# Patient Record
Sex: MALE | Race: WHITE | Employment: PART TIME | URBAN - METROPOLITAN AREA
[De-identification: names, ages, dates, MRNs, and addresses within clinical notes are randomized per-mention and may not be internally consistent; named-entity substitution may affect disease eponyms.]

---

## 2019-03-25 ENCOUNTER — HOSPITAL ENCOUNTER (OUTPATIENT)
Dept: CT IMAGING | Age: 24
Discharge: HOME | End: 2019-03-25
Attending: UROLOGY
Payer: COMMERCIAL

## 2019-03-25 DIAGNOSIS — R31.0 GROSS HEMATURIA: ICD-10-CM

## 2019-03-25 PROCEDURE — 74011636320 HC RX REV CODE- 636/320: Performed by: UROLOGY

## 2019-03-25 PROCEDURE — 74178 CT ABD&PLV WO CNTR FLWD CNTR: CPT

## 2019-03-25 RX ORDER — SODIUM CHLORIDE 0.9 % (FLUSH) 0.9 %
10 SYRINGE (ML) INJECTION
Status: COMPLETED | OUTPATIENT
Start: 2019-03-25 | End: 2019-03-25

## 2019-03-25 RX ADMIN — IOPAMIDOL 100 ML: 755 INJECTION, SOLUTION INTRAVENOUS at 15:41

## 2019-03-25 RX ADMIN — Medication 10 ML: at 15:41

## 2021-06-24 ENCOUNTER — HOSPITAL ENCOUNTER (EMERGENCY)
Age: 26
Discharge: OTHER INSTITUTION | End: 2021-06-24
Attending: EMERGENCY MEDICINE

## 2021-06-24 ENCOUNTER — HOSPITAL ENCOUNTER (INPATIENT)
Age: 26
LOS: 7 days | Discharge: HOME | DRG: 885 | End: 2021-07-02
Attending: PSYCHIATRY & NEUROLOGY | Admitting: PSYCHIATRY & NEUROLOGY
Payer: MEDICARE

## 2021-06-24 VITALS
RESPIRATION RATE: 18 BRPM | BODY MASS INDEX: 23.49 KG/M2 | HEART RATE: 67 BPM | SYSTOLIC BLOOD PRESSURE: 126 MMHG | WEIGHT: 155 LBS | DIASTOLIC BLOOD PRESSURE: 65 MMHG | OXYGEN SATURATION: 97 % | HEIGHT: 68 IN | TEMPERATURE: 98 F

## 2021-06-24 DIAGNOSIS — F20.9 SCHIZOPHRENIA, UNSPECIFIED TYPE (HCC): Primary | ICD-10-CM

## 2021-06-24 LAB
ALBUMIN SERPL-MCNC: 4.3 G/DL (ref 3.5–5)
ALBUMIN/GLOB SERPL: 1.5 {RATIO} (ref 1.1–2.2)
ALP SERPL-CCNC: 63 U/L (ref 45–117)
ALT SERPL-CCNC: 36 U/L (ref 12–78)
AMPHET UR QL SCN: NEGATIVE
ANION GAP SERPL CALC-SCNC: 8 MMOL/L (ref 5–15)
APPEARANCE UR: CLEAR
AST SERPL-CCNC: 28 U/L (ref 15–37)
BACTERIA URNS QL MICRO: NEGATIVE /HPF
BARBITURATES UR QL SCN: NEGATIVE
BASOPHILS # BLD: 0 K/UL (ref 0–0.1)
BASOPHILS NFR BLD: 0 % (ref 0–1)
BENZODIAZ UR QL: NEGATIVE
BILIRUB SERPL-MCNC: 1 MG/DL (ref 0.2–1)
BILIRUB UR QL: NEGATIVE
BUN SERPL-MCNC: 10 MG/DL (ref 6–20)
BUN/CREAT SERPL: 11 (ref 12–20)
CALCIUM SERPL-MCNC: 8.9 MG/DL (ref 8.5–10.1)
CANNABINOIDS UR QL SCN: POSITIVE
CHLORIDE SERPL-SCNC: 107 MMOL/L (ref 97–108)
CO2 SERPL-SCNC: 24 MMOL/L (ref 21–32)
COCAINE UR QL SCN: NEGATIVE
COLOR UR: ABNORMAL
COMMENT, HOLDF: NORMAL
COVID-19 RAPID TEST, COVR: NOT DETECTED
CREAT SERPL-MCNC: 0.89 MG/DL (ref 0.7–1.3)
DIFFERENTIAL METHOD BLD: ABNORMAL
DRUG SCRN COMMENT,DRGCM: ABNORMAL
EOSINOPHIL # BLD: 0 K/UL (ref 0–0.4)
EOSINOPHIL NFR BLD: 0 % (ref 0–7)
EPITH CASTS URNS QL MICRO: ABNORMAL /LPF
ERYTHROCYTE [DISTWIDTH] IN BLOOD BY AUTOMATED COUNT: 12.3 % (ref 11.5–14.5)
ETHANOL SERPL-MCNC: <10 MG/DL
FLUAV RNA SPEC QL NAA+PROBE: NOT DETECTED
FLUBV RNA SPEC QL NAA+PROBE: NOT DETECTED
GLOBULIN SER CALC-MCNC: 2.8 G/DL (ref 2–4)
GLUCOSE SERPL-MCNC: 91 MG/DL (ref 65–100)
GLUCOSE UR STRIP.AUTO-MCNC: NEGATIVE MG/DL
HCT VFR BLD AUTO: 41.7 % (ref 36.6–50.3)
HGB BLD-MCNC: 14 G/DL (ref 12.1–17)
HGB UR QL STRIP: NEGATIVE
HYALINE CASTS URNS QL MICRO: ABNORMAL /LPF (ref 0–5)
IMM GRANULOCYTES # BLD AUTO: 0 K/UL (ref 0–0.04)
IMM GRANULOCYTES NFR BLD AUTO: 0 % (ref 0–0.5)
KETONES UR QL STRIP.AUTO: 40 MG/DL
LEUKOCYTE ESTERASE UR QL STRIP.AUTO: NEGATIVE
LYMPHOCYTES # BLD: 0.8 K/UL (ref 0.8–3.5)
LYMPHOCYTES NFR BLD: 10 % (ref 12–49)
MCH RBC QN AUTO: 29.5 PG (ref 26–34)
MCHC RBC AUTO-ENTMCNC: 33.6 G/DL (ref 30–36.5)
MCV RBC AUTO: 88 FL (ref 80–99)
METHADONE UR QL: NEGATIVE
MONOCYTES # BLD: 0.5 K/UL (ref 0–1)
MONOCYTES NFR BLD: 6 % (ref 5–13)
NEUTS SEG # BLD: 6.3 K/UL (ref 1.8–8)
NEUTS SEG NFR BLD: 84 % (ref 32–75)
NITRITE UR QL STRIP.AUTO: NEGATIVE
NRBC # BLD: 0 K/UL (ref 0–0.01)
NRBC BLD-RTO: 0 PER 100 WBC
OPIATES UR QL: NEGATIVE
PCP UR QL: NEGATIVE
PH UR STRIP: 6.5 [PH] (ref 5–8)
PLATELET # BLD AUTO: 215 K/UL (ref 150–400)
PMV BLD AUTO: 10.1 FL (ref 8.9–12.9)
POTASSIUM SERPL-SCNC: 3.5 MMOL/L (ref 3.5–5.1)
PROT SERPL-MCNC: 7.1 G/DL (ref 6.4–8.2)
PROT UR STRIP-MCNC: NEGATIVE MG/DL
RBC # BLD AUTO: 4.74 M/UL (ref 4.1–5.7)
RBC #/AREA URNS HPF: ABNORMAL /HPF (ref 0–5)
RBC MORPH BLD: ABNORMAL
SAMPLES BEING HELD,HOLD: NORMAL
SARS-COV-2, COV2: NORMAL
SARS-COV-2, COV2: NOT DETECTED
SODIUM SERPL-SCNC: 139 MMOL/L (ref 136–145)
SOURCE, COVRS: NORMAL
SP GR UR REFRACTOMETRY: 1.01 (ref 1–1.03)
UA: UC IF INDICATED,UAUC: ABNORMAL
UROBILINOGEN UR QL STRIP.AUTO: 1 EU/DL (ref 0.2–1)
VALPROATE SERPL-MCNC: <3 UG/ML (ref 50–100)
WBC # BLD AUTO: 7.6 K/UL (ref 4.1–11.1)
WBC URNS QL MICRO: ABNORMAL /HPF (ref 0–4)

## 2021-06-24 PROCEDURE — 96372 THER/PROPH/DIAG INJ SC/IM: CPT

## 2021-06-24 PROCEDURE — 87635 SARS-COV-2 COVID-19 AMP PRB: CPT

## 2021-06-24 PROCEDURE — 80053 COMPREHEN METABOLIC PANEL: CPT

## 2021-06-24 PROCEDURE — 74011250636 HC RX REV CODE- 250/636: Performed by: EMERGENCY MEDICINE

## 2021-06-24 PROCEDURE — 99284 EMERGENCY DEPT VISIT MOD MDM: CPT

## 2021-06-24 PROCEDURE — 87636 SARSCOV2 & INF A&B AMP PRB: CPT

## 2021-06-24 PROCEDURE — 80307 DRUG TEST PRSMV CHEM ANLYZR: CPT

## 2021-06-24 PROCEDURE — 65220000003 HC RM SEMIPRIVATE PSYCH

## 2021-06-24 PROCEDURE — 82077 ASSAY SPEC XCP UR&BREATH IA: CPT

## 2021-06-24 PROCEDURE — 85025 COMPLETE CBC W/AUTO DIFF WBC: CPT

## 2021-06-24 PROCEDURE — 81001 URINALYSIS AUTO W/SCOPE: CPT

## 2021-06-24 PROCEDURE — 80164 ASSAY DIPROPYLACETIC ACD TOT: CPT

## 2021-06-24 PROCEDURE — 36415 COLL VENOUS BLD VENIPUNCTURE: CPT

## 2021-06-24 RX ORDER — OLANZAPINE 5 MG/1
5 TABLET ORAL
Status: DISCONTINUED | OUTPATIENT
Start: 2021-06-24 | End: 2021-07-02 | Stop reason: HOSPADM

## 2021-06-24 RX ORDER — HYDROXYZINE 25 MG/1
50 TABLET, FILM COATED ORAL
Status: DISCONTINUED | OUTPATIENT
Start: 2021-06-24 | End: 2021-07-02 | Stop reason: HOSPADM

## 2021-06-24 RX ORDER — ADHESIVE BANDAGE
30 BANDAGE TOPICAL DAILY PRN
Status: DISCONTINUED | OUTPATIENT
Start: 2021-06-24 | End: 2021-07-02 | Stop reason: HOSPADM

## 2021-06-24 RX ORDER — ACETAMINOPHEN 325 MG/1
650 TABLET ORAL
Status: DISCONTINUED | OUTPATIENT
Start: 2021-06-24 | End: 2021-07-02 | Stop reason: HOSPADM

## 2021-06-24 RX ORDER — LORAZEPAM 2 MG/ML
1 INJECTION INTRAMUSCULAR
Status: DISCONTINUED | OUTPATIENT
Start: 2021-06-24 | End: 2021-07-02 | Stop reason: HOSPADM

## 2021-06-24 RX ORDER — HALOPERIDOL 5 MG/ML
5 INJECTION INTRAMUSCULAR ONCE
Status: COMPLETED | OUTPATIENT
Start: 2021-06-24 | End: 2021-06-24

## 2021-06-24 RX ORDER — BENZTROPINE MESYLATE 1 MG/1
1 TABLET ORAL
Status: DISCONTINUED | OUTPATIENT
Start: 2021-06-24 | End: 2021-07-02 | Stop reason: HOSPADM

## 2021-06-24 RX ORDER — HALOPERIDOL 5 MG/ML
5 INJECTION INTRAMUSCULAR
Status: DISCONTINUED | OUTPATIENT
Start: 2021-06-24 | End: 2021-07-02 | Stop reason: HOSPADM

## 2021-06-24 RX ORDER — DIPHENHYDRAMINE HYDROCHLORIDE 50 MG/ML
50 INJECTION, SOLUTION INTRAMUSCULAR; INTRAVENOUS
Status: DISCONTINUED | OUTPATIENT
Start: 2021-06-24 | End: 2021-07-02 | Stop reason: HOSPADM

## 2021-06-24 RX ORDER — TRAZODONE HYDROCHLORIDE 50 MG/1
50 TABLET ORAL
Status: DISCONTINUED | OUTPATIENT
Start: 2021-06-24 | End: 2021-07-02 | Stop reason: HOSPADM

## 2021-06-24 RX ADMIN — HALOPERIDOL LACTATE 5 MG: 5 INJECTION, SOLUTION INTRAMUSCULAR at 12:13

## 2021-06-24 NOTE — ED NOTES
Pt an ECO at this time. Pt at this time yelling out and being inappropriate. Unable to get blood work at this time until pt calms down. Medicated pt per orders. Pt has been off his medications for a couple of days and is homicidal towards his dad. Hand cuffs in place. Skin warm, dry, and intact. Will continue to monitor for any changes. 1415: ED tech Ry Calderon at bedside with officer. Pt says he will provide urine sample at this time. Pt provided with boxed lunch at this time. 1425: Pt requesting something to drink. Pt provided with a gingerale. Pt yelling, \"I don't want that drink since it is cracked open. It's going to go flat. How am I supposed to eat or drink with shackles on, take them off of me. \" Pt educated on policy and offered another drink. Pt declined. 1509: Pt provided with warm blanket at this time. Pt resting comfortably. 1535: Pt sleeping comfortably at this time. 1650: Pt sleeping at this time. 1758: Pt continues sleeping comfortably at this time and remains cooperative. 1803: Patient is being transferred to [facility], Room # [address] [facility] [address]. Report given to Melissa Montero RN, RN on [facility] for routine progression of care. Report consisted of the following information SBAR, ED Summary and Recent Results. Patient transferred to receiving unit by: Lui FIERRO. Opportunity for questions and clarification was provided. Lizeth Rebolledo [id number]: Spoke with [facility] from Crisis. Pt is no longer going to [facility]. Pt will be going to [facility]. Notified charge nurse of change. Notified [facility] that pt will no longer be coming to their facility.

## 2021-06-24 NOTE — ED PROVIDER NOTES
EMERGENCY DEPARTMENT HISTORY AND PHYSICAL EXAM      Date: 6/24/2021  Patient Name: Joyce Chua    History of Presenting Illness     Chief Complaint   Patient presents with   [address] Problem     pt is an ECO brought in by [employer]. pt is off his meds. In triage, he has pressured speech. History Provided By: Patient and Law Enforcement    HPI: Joyce Chua, 27 y.o. male  presents to the ED with cc of \"Fuck you, your doctor, that is great, what you want\". Patient was brought in by law enforcement on an emergency custody order. Patient has a history of schizophrenia and has been off of his medications. Unknown initially what medications he is supposed to be taking that we do have an old list showing that he is on Depakote. Family states that he has made homicidal ideations against his father. He has been very agitated and violent. Patient refuses to answer any questions and is talking to himself in the room. Past History     Past Medical History:  No past medical history on file. Past Surgical History:  No past surgical history on file. Medications:  No current facility-administered medications on file prior to encounter. Current Outpatient Medications on File Prior to Encounter   Medication Sig Dispense Refill    divalproex DR (DEPAKOTE) 500 mg tablet Take 1 Tab by mouth three (3) times daily for 30 days. Indications: MIXED BIPOLAR I DISORDER 90 Tab 0       Family History:  Family History   Problem Relation Age of Onset    Drug Abuse Neg Hx     Psychiatric Disorder Neg Hx        Social History:  Social History     Tobacco Use    Smoking status: Current Every Day Smoker   Substance Use Topics    Alcohol use: Yes    Drug use: Not on file       Allergies: Allergies   Allergen Reactions    Amoxicillin Rash       All the above components of the past  history are auto-populated from the electronic record.   They have been reviewed and the patient has been interviewed for any pertinent past history that pertains to the patient's chief complaint and reason for visit. Not all pre-populated components may be accurate at the time this note was generated. Review of Systems   Review of Systems   Unable to perform ROS: Psychiatric disorder       Physical Exam   Physical Exam  Vitals and nursing note reviewed. Constitutional:       General: He is not in acute distress. Appearance: He is well-developed. He is not ill-appearing. HENT:      Head: Normocephalic. Eyes:      Conjunctiva/sclera: Conjunctivae normal.   Cardiovascular:      Rate and Rhythm: Normal rate and regular rhythm. Pulmonary:      Effort: Pulmonary effort is normal. No accessory muscle usage or respiratory distress. Abdominal:      General: There is no distension. Musculoskeletal:      Cervical back: Normal range of motion. Skin:     General: Skin is warm and dry. Neurological:      Mental Status: He is alert. He is disoriented. Psychiatric:         Mood and Affect: Affect is labile and angry. Speech: Speech is rapid and pressured. Behavior: Behavior is agitated and aggressive. Thought Content: Thought content includes homicidal ideation. Due to the COVID-19 pandemic, in order to reduce the spread and transmission of the virus, some basic elements of the physical exam have been deferred to reduce direct or close contact with the patient unless they are deemed to be absolutely necessary, regardless of whether the virus is highly suspected or not.       Diagnostic Study Results     Labs -     Recent Results (from the past 24 hour(s))   VALPROIC ACID    Collection Time: 06/24/21  1:34 PM   Result Value Ref Range    Valproic acid <3 (L) 50 - 192 ug/ml   METABOLIC PANEL, COMPREHENSIVE    Collection Time: 06/24/21  1:34 PM   Result Value Ref Range    Sodium 139 136 - 145 mmol/L    Potassium 3.5 3.5 - 5.1 mmol/L    Chloride 107 97 - 108 mmol/L    CO2 24 21 - 32 mmol/L    Anion gap 8 5 - 15 mmol/L    Glucose 91 65 - 100 mg/dL    BUN 10 6 - 20 MG/DL    Creatinine 0.89 0.70 - 1.30 MG/DL    BUN/Creatinine ratio 11 (L) 12 - 20      GFR est AA >60 >60 ml/min/1.73m2    GFR est non-AA >60 >60 ml/min/1.73m2    Calcium 8.9 8.5 - 10.1 MG/DL    Bilirubin, total 1.0 0.2 - 1.0 MG/DL    ALT (SGPT) 36 12 - 78 U/L    AST (SGOT) 28 15 - 37 U/L    Alk. phosphatase 63 45 - 117 U/L    Protein, total 7.1 6.4 - 8.2 g/dL    Albumin 4.3 3.5 - 5.0 g/dL    Globulin 2.8 2.0 - 4.0 g/dL    A-G Ratio 1.5 1.1 - 2.2     CBC WITH AUTOMATED DIFF    Collection Time: 06/24/21  1:34 PM   Result Value Ref Range    WBC 7.6 4.1 - 11.1 K/uL    RBC 4.74 4.10 - 5.70 M/uL    HGB 14.0 12.1 - 17.0 g/dL    HCT 41.7 36.6 - 50.3 %    MCV 88.0 80.0 - 99.0 FL    MCH 29.5 26.0 - 34.0 PG    MCHC 33.6 30.0 - 36.5 g/dL    RDW 12.3 11.5 - 14.5 %    PLATELET 311 503 - 270 K/uL    MPV 10.1 8.9 - 12.9 FL    NRBC 0.0 0  WBC    ABSOLUTE NRBC 0.00 0.00 - 0.01 K/uL    NEUTROPHILS 84 (H) 32 - 75 %    LYMPHOCYTES 10 (L) 12 - 49 %    MONOCYTES 6 5 - 13 %    EOSINOPHILS 0 0 - 7 %    BASOPHILS 0 0 - 1 %    IMMATURE GRANULOCYTES 0 0.0 - 0.5 %    ABS. NEUTROPHILS 6.3 1.8 - 8.0 K/UL    ABS. LYMPHOCYTES 0.8 0.8 - 3.5 K/UL    ABS. MONOCYTES 0.5 0.0 - 1.0 K/UL    ABS. EOSINOPHILS 0.0 0.0 - 0.4 K/UL    ABS. BASOPHILS 0.0 0.0 - 0.1 K/UL    ABS. IMM.  GRANS. 0.0 0.00 - 0.04 K/UL    DF SMEAR SCANNED      RBC COMMENTS NORMOCYTIC, NORMOCHROMIC     ETHYL ALCOHOL    Collection Time: 06/24/21  1:34 PM   Result Value Ref Range    ALCOHOL(ETHYL),SERUM <10 <10 MG/DL   SARS-COV-2    Collection Time: 06/24/21  1:34 PM   Result Value Ref Range    SARS-CoV-2 Please find results under separate order     COVID-19 RAPID TEST    Collection Time: 06/24/21  1:34 PM   Result Value Ref Range    Specimen source Nasopharyngeal      COVID-19 rapid test Not detected NOTD     COVID-19 WITH INFLUENZA A/B    Collection Time: 06/24/21  1:34 PM   Result Value Ref Range    SARS-CoV-2 Not detected NOTD      Influenza A by PCR Not detected NOTD      Influenza B by PCR Not detected NOTD     DRUG SCREEN, URINE    Collection Time: 06/24/21  2:18 PM   Result Value Ref Range    AMPHETAMINES Negative NEG      BARBITURATES Negative NEG      BENZODIAZEPINES Negative NEG      COCAINE Negative NEG      METHADONE Negative NEG      OPIATES Negative NEG      PCP(PHENCYCLIDINE) Negative NEG      THC (TH-CANNABINOL) Positive (A) NEG      Drug screen comment (NOTE)    SAMPLES BEING HELD    Collection Time: 06/24/21  2:18 PM   Result Value Ref Range    SAMPLES BEING HELD UR     COMMENT        Add-on orders for these samples will be processed based on acceptable specimen integrity and analyte stability, which may vary by analyte. URINALYSIS W/ REFLEX CULTURE    Collection Time: 06/24/21  2:19 PM    Specimen: Urine   Result Value Ref Range    Color YELLOW/STRAW      Appearance CLEAR CLEAR      Specific gravity 1.015 1.003 - 1.030      pH (UA) 6.5 5.0 - 8.0      Protein Negative NEG mg/dL    Glucose Negative NEG mg/dL    Ketone 40 (A) NEG mg/dL    Bilirubin Negative NEG      Blood Negative NEG      Urobilinogen 1.0 0.2 - 1.0 EU/dL    Nitrites Negative NEG      Leukocyte Esterase Negative NEG      WBC 0-4 0 - 4 /hpf    RBC 0-5 0 - 5 /hpf    Epithelial cells FEW FEW /lpf    Bacteria Negative NEG /hpf    UA:UC IF INDICATED CULTURE NOT INDICATED BY UA RESULT CNI      Hyaline cast 0-2 0 - 5 /lpf       Radiologic Studies -   No orders to display     CT Results  (Last 48 hours)    None        CXR Results  (Last 48 hours)    None            Medical Decision Making     I reviewed the vital signs, available nursing notes, past medical history, past surgical history, family history and social history. Vital Signs-I have reviewed the vital signs that have been made available during the patient's emergency department visit.   The vital signs auto-populated below are obtained mostly by electronic means through monitoring devices that have been downloaded into the patient's chart by the nursing staff. Some vital signs are not downloaded into the chart until after the patient has been discharged and this note has been completed, therefore some vital signs may not be available to the physician for review prior to patient's discharge or admission. The physician has reviewed the patient's triage vital signs, monitored the electronic monitoring devices remotely for any significant vital sign abnormalities, and have reviewed vital signs prior to discharge. Some vital signs reviewed at bedside or remotely utilizing electronic monitoring devices may be different than the vital signs downloaded into the electronic medical record. Some vital signs may be erroneous and inaccurate since they are obtained by electronic monitoring devices, and not all vital signs are verified for accuracy by nursing staff prior to downloading into the patient's chart. Patient Vitals for the past 24 hrs:   Temp Pulse Resp BP SpO2   06/24/21 1147 98.3 °F (36.8 °C) (!) 105 20 (!) 144/96 94 %         Records Reviewed: Nursing notes for today's visit have been reviewed. I have also reviewed most recent medical records pertinent to today's complaints, if available in our medical record system. I have also reviewed all labs and imaging results from previous results in comparison to results obtained today. If an EKG was obtained today, it has been compared to previous EKGs, if available. If arriving via EMS, the EMS report has been reviewed if made available to us within the patient's time in the emergency department. Provider Notes (Medical Decision Making):   Patient presents in acute psychosis secondary to schizophrenia. He has been having homicidal gestures and ideations towards his father. Arrives on an emergency custody order. He has been off of his medications. He is medically cleared.   I did check valproic acid levels thinking that that was the last medication were aware from ever being on these were obviously negative. No other lab abnormalities. Only THC on the drug screen. Patient required sedation here in the emergency department due to agitated and aggressive behavior. ED Course:   Initial assessment performed. The patients presenting problems have been discussed, and they are in agreement with the care plan formulated and outlined with them. I have encouraged them to ask questions as they arise throughout their visit. Orders Placed This Encounter    COVID-19 RAPID TEST    VALPROIC ACID    COMPREHENSIVE METABOLIC PANEL    CBC WITH AUTOMATED DIFF    ETHYL ALCOHOL    DRUG SCREEN, URINE    SARS-COV-2    SAMPLES BEING HELD    URINALYSIS W/ REFLEX CULTURE    COVID-19 WITH INFLUENZA A/B    haloperidol lactate (HALDOL) injection 5 mg       Procedures      Critical Care Time:   0    Disposition:  Transfer to [facility]      Diagnosis     Clinical Impression:   1.  Schizophrenia, unspecified type ([facility] [address]

## 2021-06-25 PROBLEM — F31.9 BIPOLAR 1 DISORDER (HCC): Status: ACTIVE | Noted: 2021-06-25

## 2021-06-25 PROCEDURE — 65220000003 HC RM SEMIPRIVATE PSYCH

## 2021-06-25 PROCEDURE — 74011250637 HC RX REV CODE- 250/637: Performed by: NURSE PRACTITIONER

## 2021-06-25 RX ADMIN — HYDROXYZINE HYDROCHLORIDE 50 MG: 25 TABLET, FILM COATED ORAL at 16:11

## 2021-06-25 NOTE — PROGRESS NOTES
[facility] Admission Pharmacy Medication Reconciliation     Information obtained from: chart review, TDO pre-screening assessment, patient interview performed by RN  RxQuery data available1: Yes    Comments/recommendations:  Per TDO pre-screening assessment, patient was previously on Abilify Maintena 400 mg IM monthly and levothyroxine but stopped medications in February 2021. According to insurance claims data, levothyroxine dose was 50 mcg daily, last processed on 2/25/21 for 90-day supply. Per patient interview with RN, patient denies taking any medications. Medication changes (since last review): None     1RxQuery pharmacy benefit data reflects medications filled and processed through the patient's insurance, however                this data does NOT capture whether the medication was picked up or is currently being taken by the patient. Total Time Spent: 10 minutes    Past Medical History/Disease States:  No past medical history on file. Patient allergies:    Allergies as of 06/24/2021 - Fully Reviewed 06/24/2021   Allergen Reaction Noted    Amoxicillin Rash 03/09/2016       Prior to admission:  None          Thank you,  KYRA Benoit [facility], [address] [place] [address] [address]: [phone] ([phone]  Pharmacy: [phone] ([id number])

## 2021-06-25 NOTE — PROGRESS NOTES
Auto-MST trigger per RN admission assessment; no weight loss or nutritional needs identified; will continue to follow per protocol. Hx notable only for substance abuse. Pt ate food provided to him in ED and double portions ordered.   Ht: 5'8\"  Wt: 134 lb (pt stated)  BMI: 20.37 kg/(m^2) c/w normal weight  Est energy needs: 2055 kcal, 70 g protein, 1 mL/kcal fluids  Pt will consume > 75% of meals at follow up 7-10 days  LOS, MST

## 2021-06-25 NOTE — BH NOTES
GROUP THERAPY PROGRESS NOTE    Patient did not participate in Discharge Planning Group.      Lois Barnes LPC LSATP [facility]

## 2021-06-25 NOTE — PROGRESS NOTES
Received verbal shift report from Crispin Frank RN    [id number]: Assumed care of pt. Appears anxious but denies. NAD. Denies SI/HI/AH/VH and pain. Cooperative and engaging. Meal compliant. Attending group. 1400: pt attended TDO hearing    1615: PRN Atarax given for anxiety          Problem: Falls - Risk of  Goal: *Absence of Falls  Description: Document [place] Fall Risk and appropriate interventions in the flowsheet.   Outcome: Progressing Towards Goal  Note: Fall Risk Interventions:

## 2021-06-25 NOTE — PROGRESS NOTES
Behavioral Services  Medicare Certification Upon Admission    I certify that this patient's inpatient psychiatric hospital admission is medically necessary for:    [x] (1) Treatment which could reasonably be expected to improve this patient's condition,       [x] (2) Or for diagnostic study;     AND     [x](2) The inpatient psychiatric services are provided while the individual is under the care of a physician and are included in the individualized plan of care.     Estimated length of stay/service 5 - 7 days    Plan for post-hospital care per social work    Electronically signed by Ruben Hopkins MD on 6/25/2021 at 1:47 PM

## 2021-06-25 NOTE — GROUP NOTE
CATHLEEN  GROUP DOCUMENTATION INDIVIDUAL                                                                          Group Therapy Note    Date: 6/25/2021    Group Start Time: 0900  Group End Time: 1000  Group Topic: Topic Group    [facility] - [place] [id number] ACUTE BEHAV [facility]    [place], [address] [facility] GROUP DOCUMENTATION GROUP    Group Therapy Note    Attendees: 9         Attendance: Attended    Patient's Goal:  To participate in healthy relationships [employer] game    Interventions/techniques: Supported-things pertaining to relationships        Interactions: Interacted appropriately    Mental Status: Calm    Behavior/appearance: Needed prompting    Goals Achieved:        Additional Notes:  Pt arrived late-declined active participation    [facility]

## 2021-06-25 NOTE — GROUP NOTE
CATHLEEN  GROUP DOCUMENTATION INDIVIDUAL                                                                          Group Therapy Note    Date: 6/25/2021    Group Start Time: 1100  Group End Time: 1200  Group Topic: Topic Group    [facility] - [place] [id number] ACUTE BEHAV [facility]    [place], [address] [facility] GROUP DOCUMENTATION GROUP    Group Therapy Note    Attendees: 9         Attendance: Attended    Patient's Goal:  To participate in relaxation activity    Interventions/techniques: Supported-music        Interactions: Interacted appropriately    Mental Status: Calm    Behavior/appearance: Needed prompting    Goals Achieved: Additional Notes:  Pt left session early.     Renate Gallegos
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
## 2021-06-25 NOTE — GROUP NOTE
CATHLEEN  GROUP DOCUMENTATION INDIVIDUAL                                                                          Group Therapy Note    Date: 6/25/2021    Group Start Time: 1500  Group End Time: 1600  Group Topic: Recreational/Music Therapy    [facility] - [place] [address] ACUTE BEHAV HLTH    [facility] GROUP DOCUMENTATION GROUP    Group Therapy Note    Attendees: 10         Attendance: Attended    Interventions/techniques: Supported-crafts,games,music    Follows Directions:  Followed directions    Interactions: Interacted appropriately    Mental Status: Calm    Behavior/appearance: Attentive, Cooperative and Needed prompting    Goals Achieved: Able to engage in interactions and Able to listen to others      Additional Notes:  Pleasant-active participant in game    Shelba Orn
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
## 2021-06-26 LAB — TSH SERPL DL<=0.05 MIU/L-ACNC: 5.43 UIU/ML (ref 0.36–3.74)

## 2021-06-26 PROCEDURE — 65220000003 HC RM SEMIPRIVATE PSYCH

## 2021-06-26 PROCEDURE — 36415 COLL VENOUS BLD VENIPUNCTURE: CPT

## 2021-06-26 PROCEDURE — 74011250637 HC RX REV CODE- 250/637: Performed by: NURSE PRACTITIONER

## 2021-06-26 PROCEDURE — 84443 ASSAY THYROID STIM HORMONE: CPT

## 2021-06-26 RX ORDER — ARIPIPRAZOLE 5 MG/1
10 TABLET ORAL DAILY
Status: DISCONTINUED | OUTPATIENT
Start: 2021-06-26 | End: 2021-06-29

## 2021-06-26 RX ADMIN — ARIPIPRAZOLE 10 MG: 5 TABLET ORAL at 13:03

## 2021-06-26 RX ADMIN — HYDROXYZINE HYDROCHLORIDE 50 MG: 25 TABLET, FILM COATED ORAL at 14:57

## 2021-06-26 NOTE — BH NOTES
GROUP THERAPY PROGRESS NOTE    Patient did not participate in Coping Skills group.      SHOSHANA Fernandez

## 2021-06-26 NOTE — BH NOTES
GROUP THERAPY PROGRESS NOTE      Patient is participating in coping skills group. Group time: 90 minutes     Personal goal for participation: Learn coping skills to reduce negative emotions contributing to uncontrolled anger. Goal orientation: Personal     Group therapy participation: active     Therapeutic interventions reviewed and discussed: Group discussion on why uncontrolled anger can be a problem and the consequences of uncontrolled anger that patient has experienced. Patient discussed issues they have had with being angry and ways they have tried to combat uncontrolled feelings of anger. This lead to discussion of coping skills to control anger and ways to feel better that each patient has tried or that they have heard of. Completed worksheets that help with understanding triggers, challenges, coping skills, potential solutions and plans. Impression of participation:  Bryson Alexijabari actively participated in group, but hijacked the group discussion sometimes. He was hyperverbal at times and tangential in thought.        Romelia SINGH, HP-T, MA

## 2021-06-26 NOTE — PROGRESS NOTES
1930 - verbal shift change report received from Red Aguilar, [address] [place] [address] - pt asleep in room. VS taken and are stable. A&O x4. Denies SI/HI/AH/VH. Pt was pleasant in conversation after awakening him. Cordial to this writer. Slightly animated affect w/ euthymic mood. Pt smiling, in good spirits, c/o, \"I just feel so tired all the time since I've been here. I guess I just need to catch up on sleep. \" Pt then went back to bed. 2030 - pt did awaken to receive PM snack. Declined any need for PRNs at this time - has no scheduled meds. 0500 - agreed to have labs drawn which were then taken to laboratory    0720 - verbal shift change report given to JANET Call. Slept 6 hrs.

## 2021-06-26 NOTE — PROGRESS NOTES
Problem: Altered Thought Process (Adult/Pediatric)  Goal: *STG: Participates in treatment plan  Outcome: Progressing Towards Goal  Goal: *STG: Remains safe in hospital  Outcome: Progressing Towards Goal  Goal: *STG: Seeks staff when feelings of anxiety and fear arise  Outcome: Progressing Towards Goal  Goal: *STG: Complies with medication therapy  Outcome: Progressing Towards Goal

## 2021-06-26 NOTE — PROGRESS NOTES
0715: Bedside shift change report given to Wyatt Anders RN (oncoming nurse) by Justen Chaparro RN (offgoing nurse). Report included the following information SBAR, Kardex, Procedure Summary, Intake/Output, MAR, Accordion, Recent Results, and Med Rec Status. 2435: Assumed care of pt pacing hallway. AO x 4. Denies pain, anxiety, depression, SI, HI, and AVH. Pt had rapid speech with circumstantial and tangential thought processes. States he is \"awake and ready for the day, I know it wont be over in 5 minutes, but I'm ready for it; I'm just so energized and have no time to be depressed. \" Discharged focused because he wants to see a new girl that he met and wants to pursue a relationship.     9272-7693: No scheduled medications. Meal compliant. Present on unit. Pacing hallways. Frequently interrupts conversations, but is pleasant. 9964-3298: Present on unit, attended group. Igor Spencer NP restarted Abilify. NAD, no behavioral concerns, no PRNs. 8390-4462: Present on unit, reading, pacing hallway. Med/meal compliant. NAD, no behavioral concerns, no PRNs. 8747-5841: Pt has been present on unit. Shared with RN that unit milieu has increased his anxiety- PRN atarax given. NAD, no behavioral concerns. 4492-0288: Med/meal compliant. Pt has been present on unit. Reports feeling better after atarax. NAD, no behavioral concerns, no PRNs. Problem: Altered Thought Process (Adult/Pediatric)  Goal: *STG: Participates in treatment plan  Outcome: Progressing Towards Goal  Goal: *STG: Remains safe in hospital  Outcome: Progressing Towards Goal     Problem: Falls - Risk of  Goal: *Absence of Falls  Description: Document Susanne Fall Risk and appropriate interventions in the flowsheet.   Outcome: Progressing Towards Goal  Note: Fall Risk Interventions:

## 2021-06-26 NOTE — BH NOTES
GROUP THERAPY PROGRESS NOTE     Patient is participating in Recreational Therapy/Coping Skills Group. Group time: 60 minutes       Personal goal for participation: To concentrate on selected task; positively engage in interactions with others; actively listen to others; self-disclose thoughts, emotions and behaviors; and discuss coping skills. Goal orientation: Personal     Group therapy participation: active     Therapeutic interventions reviewed and discussed: Group members participated in a game, music and art recreational therapy activity. Patients were able to actively participate in activity while engaging in conversation with staff and other patients. Patients appropriately socialized and processed presenting problems and coping skills while engaging in activity. Impression of participation:  Doroteo Connors was in and out of group, but when he was in, he actively participated by coloring and listening to music.      SAMANTHA Rodriguez, QMHP-T, MA

## 2021-06-27 PROCEDURE — 65220000003 HC RM SEMIPRIVATE PSYCH

## 2021-06-27 PROCEDURE — 74011250637 HC RX REV CODE- 250/637: Performed by: NURSE PRACTITIONER

## 2021-06-27 RX ORDER — LEVOTHYROXINE SODIUM 50 UG/1
50 TABLET ORAL
Status: DISCONTINUED | OUTPATIENT
Start: 2021-06-27 | End: 2021-07-02 | Stop reason: HOSPADM

## 2021-06-27 RX ADMIN — LEVOTHYROXINE SODIUM 50 MCG: 50 TABLET ORAL at 10:48

## 2021-06-27 RX ADMIN — HYDROXYZINE HYDROCHLORIDE 50 MG: 25 TABLET, FILM COATED ORAL at 12:34

## 2021-06-27 RX ADMIN — ARIPIPRAZOLE 10 MG: 5 TABLET ORAL at 07:44

## 2021-06-27 NOTE — PROGRESS NOTES
0700: Bedside shift change report given to [address] Alex, RN (oncoming nurse) by Jennifer Diaz RN (offgoing nurse). Report included the following information SBAR, Kardex, Intake/Output, MAR, Accordion, Recent Results, and Med Rec Status. 5533: Assumed care of pt walking in hallway. Pt is AO x 4. Rapid and circumstantial in speech and thoughts. Pt states he slept really well last night despite night shift report that he slept for 3 hours (broken up). He shared that he was up moving his furniture and writing and that he is feeling \"really happy. '' Denied depression (\"that word is not even in my vocabulary\"), anxiety, SI, HI, AVH. Said that he took a shower this morning. 4571-1839: Med/meal compliant. Present on unit, attended group. Pt is hyper verbal, but pleasant and cooperative. He continues to thank staff for starting him back on his abilify because his thoughts are \"clearing up\" and \"make more sense to me. \" NAD, no behavioral concerns, no PRNs. 8010-3485: Present on unit, attended group. Can be intrusive and interrupt conversations. Spoke with his mother on the phone. NAD, no behavioral concerns, no PRNs. 1418-1850: Present on unit. Took another shower today. PRN atarax given for anxiety. NAD, no behavioral concerns. 4883-1771: No change. NAD, no behavioral concerns, no PRNs.    2841-0191: Pt has been quiet in his room. NAD, no behavioral concerns, no PRNs.        Problem: Altered Thought Process (Adult/Pediatric)  Goal: *STG: Remains safe in hospital  Outcome: Progressing Towards Goal

## 2021-06-27 NOTE — PROGRESS NOTES
1950: PT in hallway walking quietly. Pt denied SI/HI/AH/VH. Pt denied pain. Pt denied anxiety. PT stated \"I am good, I just am hungry\" Pt denied needing anything at this time. 2030:Pt in day room eating snack with peers. 2145: Pt in room resting quietly. No issues observed at this time. 2240: Pt awake in room, resting quietly. No issues observed at this time. 2301:  Pt appears asleep, breathing visible. No issues observed at this time. 0016: Pt appears asleep, breathing visible. No issues observed at this time. 0125: Pt appears asleep, breathing visible. No issues observed at this time. 0247: Pt talking with writer at nurses station. Pt declined needing anything at this time. 0340: Pt talking with writer at nurses station. Pt declined needing anything at this time. 0428: Pt walking quietly in hallway. Declined needing anything at this time. 0555: Pt in room resting quietly. No issues observed. 7383: Pt talking to writer. Pt stating \"Why do people climb mountains why don't they just pray in front of it and be done with it? \" Pt was animated in talking with writer. Pt declined needing anything at this time. PT slept for 3 hour               Problem: Altered Thought Process (Adult/Pediatric)  Goal: *STG: Participates in treatment plan  Outcome: Progressing Towards Goal  Goal: *STG: Remains safe in hospital  Outcome: Progressing Towards Goal  Goal: *STG: Complies with medication therapy  Outcome: Progressing Towards Goal  Goal: *STG: Decreased delusional thinking  Outcome: Progressing Towards Goal  Goal: *STG: Decreased hallucinations  Outcome: Progressing Towards Goal  Goal: *STG: Absence of lethality  Outcome: Progressing Towards Goal     Problem: Falls - Risk of  Goal: *Absence of Falls  Description: Document Susanne Fall Risk and appropriate interventions in the flowsheet.   Outcome: Progressing Towards Goal  Note: Fall Risk Interventions:                                Problem: Discharge Planning  Goal: *Discharge to safe environment  Outcome: Progressing Towards Goal

## 2021-06-27 NOTE — BH NOTES
GROUP THERAPY PROGRESS NOTE    Patient is participating in Recreational Therapy/Coping Skills Group. Group time: 60 minutes      Personal goal for participation: To concentrate on selected task; positively engage in interactions with others; actively listen to others; self-disclose thoughts, emotions and behaviors; and discuss coping skills. Goal orientation: Personal    Group therapy participation: minimal    Therapeutic interventions reviewed and discussed: Group members participated in a game, music and art recreational therapy activity. Patients were able to actively participate in activity while engaging in conversation with staff and other patients. Patients appropriately socialized and processed presenting problems and coping skills while engaging in activity. Impression of participation: Pt attended group late and left group early. Pt tangential, bizarre, hyperverbal, polite and cooperative. Pt completed art activity and listened to music. Patient processed hopes/goals for the future. Pt showed active listening skills.     SHOSHANA Hogan

## 2021-06-27 NOTE — BH NOTES
GROUP THERAPY PROGRESS NOTE    Patient is participating in Coping Skills group. Group time: 50 minutes     Personal goal for participation: Learn coping skills to improve mental health, reduce stress and work through uncomfortable emotions    Goal orientation: Personal    Group therapy participation: active    Therapeutic interventions reviewed and discussed: Group discussion on ways patients are coping with mental health needs, stress and uncomfortable emotions. Discussion regarding alternative positive coping skills using each letter of the alphabet, resulting in patients having a list of 26+ positive coping skills to access. Impression of participation: Pt hyperverbal, tangential, bizarre, calm and polite, easily redirected. Pt processed need for coping skills to work through stressors. Pt had poor insight into positive coping skills but open to psycho education from . Pt identified bird watching and sleeping as positive coping skills. Pt processed the importance of having activities to do by yourself and with others.     SHOSHANA Azul

## 2021-06-27 NOTE — BH NOTES
GROUP THERAPY PROGRESS NOTE    Patient did not participate in Coping Skills group.      Rian Badillo MSW

## 2021-06-27 NOTE — PROGRESS NOTES
Laboratory monitoring for mood stabilizer and antipsychotics:    Recommended baseline monitoring has not been completed based on this patient's current medication regimen. The following labs have been ordered to complete baseline monitoring: HgA1c, Lipid panel     The patient is currently taking the following medication(s):   Current Facility-Administered Medications   Medication Dose Route Frequency    levothyroxine (SYNTHROID) tablet 50 mcg  50 mcg Oral 6am    ARIPiprazole (ABILIFY) tablet 10 mg  10 mg Oral DAILY       Height, Weight, BMI Estimation  Estimated body mass index is 20.37 kg/m² as calculated from the following:    Height as of this encounter: 172.7 cm (68\"). Weight as of this encounter: 60.8 kg (134 lb). Renal Function, Hepatic Function and Chemistry  Estimated Creatinine Clearance: 106.3 mL/min (by C-G formula based on SCr of 0.89 mg/dL). Lab Results   Component Value Date/Time    Sodium 139 06/24/2021 01:34 PM    Potassium 3.5 06/24/2021 01:34 PM    Chloride 107 06/24/2021 01:34 PM    CO2 24 06/24/2021 01:34 PM    Anion gap 8 06/24/2021 01:34 PM    Glucose 91 06/24/2021 01:34 PM    BUN 10 06/24/2021 01:34 PM    Creatinine 0.89 06/24/2021 01:34 PM    BUN/Creatinine ratio 11 (L) 06/24/2021 01:34 PM    GFR est AA >60 06/24/2021 01:34 PM    GFR est non-AA >60 06/24/2021 01:34 PM    Calcium 8.9 06/24/2021 01:34 PM    ALT (SGPT) 36 06/24/2021 01:34 PM    Alk.  phosphatase 63 06/24/2021 01:34 PM    Protein, total 7.1 06/24/2021 01:34 PM    Albumin 4.3 06/24/2021 01:34 PM    Globulin 2.8 06/24/2021 01:34 PM    A-G Ratio 1.5 06/24/2021 01:34 PM    Bilirubin, total 1.0 06/24/2021 01:34 PM       Lab Results   Component Value Date/Time    Glucose 91 06/24/2021 01:34 PM         Hematology  Lab Results   Component Value Date/Time    WBC 7.6 06/24/2021 01:34 PM    HGB 14.0 06/24/2021 01:34 PM    HCT 41.7 06/24/2021 01:34 PM    PLATELET 059 39/79/0321 01:34 PM    MCV 88.0 06/24/2021 01:34 PM Lipids  Lab Results   Component Value Date/Time    Cholesterol, total 118 03/20/2016 05:42 AM    HDL Cholesterol 31 03/20/2016 05:42 AM    LDL, calculated 70.6 03/20/2016 05:42 AM    Triglyceride 82 03/20/2016 05:42 AM    CHOL/HDL Ratio 3.8 03/20/2016 05:42 AM       Thyroid Function    Lab Results   Component Value Date/Time    TSH 5.43 (H) 06/26/2021 05:28 AM       Visit Vitals  /75 (BP 1 Location: Right upper arm, BP Patient Position: Standing)   Pulse 92   Temp 98.6 °F (37 °C)   Resp 18   Ht 172.7 cm (68\")   Wt 60.8 kg (134 lb)   SpO2 95%   BMI 20.37 kg/m²       Thank you,  Nelly Martinez, Pharm. D.  [phone]

## 2021-06-28 LAB
CHOLEST SERPL-MCNC: 128 MG/DL
EST. AVERAGE GLUCOSE BLD GHB EST-MCNC: 100 MG/DL
HBA1C MFR BLD: 5.1 % (ref 4–5.6)
HDLC SERPL-MCNC: 52 MG/DL
HDLC SERPL: 2.5 {RATIO} (ref 0–5)
LDLC SERPL CALC-MCNC: 63.4 MG/DL (ref 0–100)
TRIGL SERPL-MCNC: 63 MG/DL (ref ?–150)
VLDLC SERPL CALC-MCNC: 12.6 MG/DL

## 2021-06-28 PROCEDURE — 36415 COLL VENOUS BLD VENIPUNCTURE: CPT

## 2021-06-28 PROCEDURE — 74011250637 HC RX REV CODE- 250/637: Performed by: NURSE PRACTITIONER

## 2021-06-28 PROCEDURE — 83036 HEMOGLOBIN GLYCOSYLATED A1C: CPT

## 2021-06-28 PROCEDURE — 65220000003 HC RM SEMIPRIVATE PSYCH

## 2021-06-28 PROCEDURE — 80061 LIPID PANEL: CPT

## 2021-06-28 RX ADMIN — TRAZODONE HYDROCHLORIDE 50 MG: 50 TABLET ORAL at 21:53

## 2021-06-28 RX ADMIN — ARIPIPRAZOLE 10 MG: 5 TABLET ORAL at 08:14

## 2021-06-28 RX ADMIN — HYDROXYZINE HYDROCHLORIDE 50 MG: 25 TABLET, FILM COATED ORAL at 16:11

## 2021-06-28 RX ADMIN — LEVOTHYROXINE SODIUM 50 MCG: 50 TABLET ORAL at 05:28

## 2021-06-28 NOTE — BH NOTES
Psychiatric Progress Note    Patient: Edward Pate MRN: [id number]  SSN: [id number]    YOB: 1995  Age: 27 y.o. Sex: male      Admit Date: 6/24/2021    LOS: 4 days     Subjective:     Edward Pate  reports feeling well, \"thank you for asking\" and moods are elevated. Denies SI/HI/AH/VH. No aggression or violence. Appropriately interactive and aware. Tolerating medications well. Eating well and sleeping poorly (4hrs). Improving slowly. Committed with continued bizarre behaviors.     Objective:     Vitals:    06/26/21 1950 06/27/21 0741 06/27/21 1940 06/28/21 0720   BP: 106/76 131/75 128/79 136/65   Pulse: 81 92 83 91   Resp: 18 18 16 18   Temp: 97.7 °F (36.5 °C) 98.6 °F (37 °C) 97.7 °F (36.5 °C) 97.5 °F (36.4 °C)   SpO2: 100% 95% 97% 100%   Weight:       Height:            Mental Status Exam:   Sensorium  disorganized   Relations cooperative   Eye Contact appropriate   Appearance:  age appropriate   Speech:  hyperverbal, normal volume and pressured   Thought Process: disorganized   Thought Content free of delusions and free of hallucinations   Suicidal ideations none   Mood:  euphoric   Affect:  Elevated   Memory   adequate   Concentration:  impaired   Insight:  limited   Judgment:  fair       MEDICATIONS:  Current Facility-Administered Medications   Medication Dose Route Frequency    levothyroxine (SYNTHROID) tablet 50 mcg  50 mcg Oral 6am    ARIPiprazole (ABILIFY) tablet 10 mg  10 mg Oral DAILY    OLANZapine (ZyPREXA) tablet 5 mg  5 mg Oral Q6H PRN    haloperidol lactate (HALDOL) injection 5 mg  5 mg IntraMUSCular Q6H PRN    benztropine (COGENTIN) tablet 1 mg  1 mg Oral BID PRN    diphenhydrAMINE (BENADRYL) injection 50 mg  50 mg IntraMUSCular BID PRN    hydrOXYzine HCL (ATARAX) tablet 50 mg  50 mg Oral TID PRN    LORazepam (ATIVAN) injection 1 mg  1 mg IntraMUSCular Q4H PRN    traZODone (DESYREL) tablet 50 mg  50 mg Oral QHS PRN    acetaminophen (TYLENOL) tablet 650 mg 650 mg Oral Q4H PRN    magnesium hydroxide (MILK OF MAGNESIA) 400 mg/5 mL oral suspension 30 mL  30 mL Oral DAILY PRN      DISCUSSION:   the risks and benefits of the proposed medication  off label use of an approved drug/prescription discussed with patient     Lab/Data Review: All lab results for the last 24 hours reviewed. Recent Results (from the past 24 hour(s))   LIPID PANEL    Collection Time: 06/28/21  4:42 AM   Result Value Ref Range    Cholesterol, total 128 <200 MG/DL    Triglyceride 63 <150 MG/DL    HDL Cholesterol 52 MG/DL    LDL, calculated 63.4 0 - 100 MG/DL    VLDL, calculated 12.6 MG/DL    CHOL/HDL Ratio 2.5 0.0 - 5.0     HEMOGLOBIN A1C WITH EAG    Collection Time: 06/28/21  4:42 AM   Result Value Ref Range    Hemoglobin A1c 5.1 4.0 - 5.6 %    Est. average glucose 100 mg/dL         Assessment:     Principal Problem:    Bipolar 1 disorder ([facility] [address] (6/25/2021)    Active Problems:    Cannabis abuse (3/11/2016)        Plan:     Continue current care  Collateral information  Medication modification as appropriate  Disposition planning with social work    I certify that this patient's inpatient psychiatric hospital services furnished since the previous certification were, and continue to be, required for treatment that could reasonably be expected to improve the patient's condition, or for diagnostic study, and that the patient continues to need, on a daily basis, active treatment furnished directly by or requiring the supervision of inpatient psychiatric facility personnel. In addition, the hospital records show that services furnished were intensive treatment services, admission or related services, or equivalent services.   Signed By: Amadou Jose MD     June 28, 2021

## 2021-06-28 NOTE — BH NOTES
Weekend Coverage at [facility]  CC:  I am doing okay thank you for asking  Mood:euphoric, bizarre  Affect: mood-congruent  Appearance: appropriate  Thought Process: disorganized at times  SI/HI/AVH: denies  Sleep: poor, 3 hours reported  Appetite: good  Withdrawal SXs: N/A  Treatment Compliant: yes  Treatment plan: Continue current treatment plan, Dispo planning with CM

## 2021-06-28 NOTE — GROUP NOTE
CATHLEEN  GROUP DOCUMENTATION INDIVIDUAL                                                                          Group Therapy Note    Date: 6/28/2021    Group Start Time: 1100  Group End Time: 1200  Group Topic: Topic Group    [address] [facility] 3 ACUTE BEHAV [facility], [address] [facility] GROUP DOCUMENTATION GROUP    Group Therapy Note    Attendees: 9         Attendance: Attended    Patient's Goal: to participate in relaxation activity    Interventions/techniques: Supported-music    Follows Directions:  Followed directions    Interactions: Interacted appropriately    Mental Status: Calm    Behavior/appearance: Attentive, Cooperative and Needed prompting    Goals Achieved: Able to engage in interactions and Able to listen to others      Additional Notes:      Janette Johnson

## 2021-06-28 NOTE — PROGRESS NOTES
[id number]- Report given by Slick Shields and I assume care of the patient. Patient has been awake most of the night. Patient seems to be manic with tangential speech with peers and staff. Patient slept 4 hrs tonight. Patient labs drawn and the patient was compliant with meds this morning.

## 2021-06-28 NOTE — PROGRESS NOTES
Laboratory monitoring for mood stabilizer and antipsychotics:    Recommended baseline monitoring has been completed based on this patient's current medication regimen. The patient is currently taking the following medication(s):   Current Facility-Administered Medications   Medication Dose Route Frequency    levothyroxine (SYNTHROID) tablet 50 mcg  50 mcg Oral 6am    ARIPiprazole (ABILIFY) tablet 10 mg  10 mg Oral DAILY       Height, Weight, BMI Estimation  Estimated body mass index is 20.37 kg/m² as calculated from the following:    Height as of this encounter: 172.7 cm (68\"). Weight as of this encounter: 60.8 kg (134 lb). Renal Function, Hepatic Function and Chemistry  Estimated Creatinine Clearance: 106.3 mL/min (by C-G formula based on SCr of 0.89 mg/dL). Lab Results   Component Value Date/Time    Sodium 139 06/24/2021 01:34 PM    Potassium 3.5 06/24/2021 01:34 PM    Chloride 107 06/24/2021 01:34 PM    CO2 24 06/24/2021 01:34 PM    Anion gap 8 06/24/2021 01:34 PM    Glucose 91 06/24/2021 01:34 PM    BUN 10 06/24/2021 01:34 PM    Creatinine 0.89 06/24/2021 01:34 PM    BUN/Creatinine ratio 11 (L) 06/24/2021 01:34 PM    GFR est AA >60 06/24/2021 01:34 PM    GFR est non-AA >60 06/24/2021 01:34 PM    Calcium 8.9 06/24/2021 01:34 PM    ALT (SGPT) 36 06/24/2021 01:34 PM    Alk.  phosphatase 63 06/24/2021 01:34 PM    Protein, total 7.1 06/24/2021 01:34 PM    Albumin 4.3 06/24/2021 01:34 PM    Globulin 2.8 06/24/2021 01:34 PM    A-G Ratio 1.5 06/24/2021 01:34 PM    Bilirubin, total 1.0 06/24/2021 01:34 PM       Lab Results   Component Value Date/Time    Glucose 91 06/24/2021 01:34 PM       Lab Results   Component Value Date/Time    Hemoglobin A1c 5.1 06/28/2021 04:42 AM       Hematology  Lab Results   Component Value Date/Time    WBC 7.6 06/24/2021 01:34 PM    HGB 14.0 06/24/2021 01:34 PM    HCT 41.7 06/24/2021 01:34 PM    PLATELET 090 98/56/6064 01:34 PM    MCV 88.0 06/24/2021 01:34 PM       Lipids  Lab Results   Component Value Date/Time    Cholesterol, total 128 06/28/2021 04:42 AM    HDL Cholesterol 52 06/28/2021 04:42 AM    LDL, calculated 63.4 06/28/2021 04:42 AM    Triglyceride 63 06/28/2021 04:42 AM    CHOL/HDL Ratio 2.5 06/28/2021 04:42 AM       Thyroid Function    Lab Results   Component Value Date/Time    TSH 5.43 (H) 06/26/2021 05:28 AM     Vitals  Visit Vitals  /65 (BP 1 Location: Left arm, BP Patient Position: Sitting)   Pulse 91   Temp 97.5 °F (36.4 °C)   Resp 18   Ht 172.7 cm (68\")   Wt 60.8 kg (134 lb)   SpO2 100%   BMI 20.37 kg/m²       Jerome Chisholm, PHARMD, BCPS  [phone] (pharmacy)

## 2021-06-28 NOTE — BH NOTES
Weekend Coverage at [facility]  CC:  I feel healthy and relaxed    Mood:euphoric, elated- reported as \"easily agitated\"  Affect: mood-congruent  Appearance: appropriate  Thought Process: goal-directed  SI/HI/AVH: denies  Sleep: fair  Appetite: good  Withdrawal SXs: N/A  Treatment Compliant: yes  Treatment plan:Initiate abilify PO with plans to restart SETHI. Synthroid 50mcg.  Continue current treatment plan, Dispo planning with CM

## 2021-06-28 NOTE — GROUP NOTE
CATHLEEN  GROUP DOCUMENTATION INDIVIDUAL                                                                          Group Therapy Note    Date: 6/28/2021    Group Start Time: 0900  Group End Time: 1000  Group Topic: Topic Group    [facility] [place] 3 ACUTE BEHAV TH    [place], [address] [facility] GROUP DOCUMENTATION GROUP    Group Therapy Note    Attendees: 7         Attendance: Attended    Patient's Goal:  To participate in mental health journey game    Interventions/techniques: Supported-choices in recovery    Follows Directions:  Followed directions    Interactions: Interacted appropriately    Mental Status: Calm    Behavior/appearance: Cooperative and Needed prompting    Goals Achieved: Able to engage in interactions and Able to listen to others      Additional Notes:  Pt arrived late,    Paulette Rivero

## 2021-06-28 NOTE — PROGRESS NOTES
0700: Bedside shift change report given to Paresh Pizarro RN (oncoming nurse) by Ovi Millan RN (offgoing nurse). Report included the following information SBAR, Kardex, Intake/Output, MAR, Accordion, Recent Results, and Med Rec Status. 0720: Assumed care of pt ambulating in hallway and talking to peers. AO x 4. No complaints of pain, denies depression, anxiety, SI, HI, AVH. Continues to have rapid speech and tangential thought process. 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 Friendly and cooperative. 7033-7690: Med/meal compliant. States he is feeling better than he ever has and can't remember ever feeling this good. Shared that he took a shower this morning. NAD, no behavioral concerns, no PRNs. 9831-9987: Pt has been present on unit. He has started wearing his gown as a cape. He call its his \"cape of pride. \" Hyperverbal. Pt refused any medications for anxiety. 8595-4405: Present on unit. Remains hyperverbal. NAD, no behavioral concerns, no PRNs. 7554-3789: Present on unit; goes between room and hallway. Hyper verbal. NAD, no behavioral concerns, no PRN. 7501-5678: Ate dinner and present on unit. He is sad that his family didn't come to visit today, but being here allows him to figure out what he needs to do on the outside. NAD, no behavioral concerns, no PRNs.

## 2021-06-28 NOTE — BH NOTES
Behavioral Health Treatment Team Note     Patient goal(s) for today: continue taking meds as prescribe, engage in unit activities, participate in hygiene/ADLs, prepare for discharge, follow directions from staff, contact support team  Treatment team focus/goals: continue adjusting meds as needed, discharge planning, update support system    Progress note: Patient in bed when treatment team arrived but quickly roused and stood up to engage staff. Patient hyperverbal and initially focused on the corner house outside his window. Patient reports he was initially questioning his medications but now states he wants to keep taking whatever is making him have such good thoughts come in. Patient tangential at times and often made vague statements. Patient complaining of foot pain due to walking in socks. He reports having appropriate shoes if he would take out laces but he declines to remove laces. Patient read a poem from his journal. He reports he is willing to follow up with Lui with the maturity of a 27year old. SW attempted to contact patient's mother but received no response. No option to leave a voicemail. LOS:  4  Expected LOS: 6-8    Insurance info/prescription coverage:  VA Medicare (Part A Only)   Date of last family contact:  WILLY mother Ervin Black ([phone])  Family requesting physician contact today:  no  Discharge plan:  Return to home  Guns in the home:  no   Outpatient provider(s):   To be referred to [facility]    Participating treatment team members: Kyleigh Haryd, SHOSHANA Oneil, Dr. Claire Cabot, MD

## 2021-06-28 NOTE — GROUP NOTE
CATHLEEN  GROUP DOCUMENTATION INDIVIDUAL                                                                          Group Therapy Note    Date: 6/28/2021    Group Start Time: 1500  Group End Time: 1600  Group Topic: Recreational/Music Therapy    [facility] - [place] [address] ACUTE BEHAV [facility]    [place], [address] [facility] GROUP DOCUMENTATION GROUP    Group Therapy Note    Attendees: 6         Attendance: Attended    Patient's Goal:  To concentrate on selected task    Interventions/techniques: Supported-crafts,games,music    Follows Directions: Followed directions    Interactions: Interacted appropriately    Mental Status: Anxious    Behavior/appearance: Cooperative and Needed prompting    Goals Achieved:  Actively participated in game      Additional Notes:  Short attention span-left session early    uSellen Mercado

## 2021-06-29 PROCEDURE — 65220000003 HC RM SEMIPRIVATE PSYCH

## 2021-06-29 PROCEDURE — 74011250637 HC RX REV CODE- 250/637: Performed by: NURSE PRACTITIONER

## 2021-06-29 RX ADMIN — ARIPIPRAZOLE 10 MG: 5 TABLET ORAL at 08:30

## 2021-06-29 RX ADMIN — TRAZODONE HYDROCHLORIDE 50 MG: 50 TABLET ORAL at 21:53

## 2021-06-29 RX ADMIN — LEVOTHYROXINE SODIUM 50 MCG: 50 TABLET ORAL at 05:57

## 2021-06-29 RX ADMIN — ACETAMINOPHEN 650 MG: 325 TABLET ORAL at 12:31

## 2021-06-29 NOTE — BH NOTES
Behavioral Health Treatment Team Note      Patient goal(s) for today: continue taking meds as prescribe, engage in unit activities, participate in hygiene/ADLs, prepare for discharge, follow directions from staff, contact support team  Treatment team focus/goals: continue adjusting meds as needed, discharge planning, update support system     Progress note: Patient rearranged his room since last visit, moving beds together by the window and moving the dry erase poster to another wall. He denied SI/HI, AVH (other than his own thoughts, he said). He reports the medications are working fabulously and cites his increased appetite as evidence. Patient, when asked about sleep, reports he naps during the day. He noted no concerns regarding sleep despite nurses reporting he only slept 2.5 hrs the previous night. Patient presented with loose associated, hyperverbal, but was pleasant and redirectable. Patient reports he has been speaking to family. Patient focused on showing another patient some sites in [place]. MD increasing patient's abilify.     LOS:  5                       Expected LOS: 6-8     Insurance info/prescription coverage:  VA Medicare (Part A Only)   Date of last family contact:  WILLY mother Sita Castro ([phone])  Family requesting physician contact today:  no  Discharge plan:  Return to home  Guns in the home:  no   Outpatient provider(s):   To be referred to [facility]     Participating treatment team members: Heather Reno MSW, Dr. Levi Anthony MD

## 2021-06-29 NOTE — GROUP NOTE
CATHLEEN  GROUP DOCUMENTATION INDIVIDUAL                                                                          Group Therapy Note    Date: 6/29/2021    Group Start Time: 0900  Group End Time: 1000  Group Topic: Topic Group    [address] [facility] 3 ACUTE BEHAV [facility], [address] [facility] GROUP DOCUMENTATION GROUP    Group Therapy Note    Attendees: 8         Attendance: Attended    Patient's Goal:  To participate in relaxation activity    Interventions/techniques: Supported-music        Interactions: Disorganized interaction    Mental Status: Anxious    Behavior/appearance: Needed prompting    Goals Achieved:        Additional Notes:  Disorganized thoughts,hyper verbal-left session early    Dinh Becerra

## 2021-06-29 NOTE — BH NOTES
GROUP THERAPY PROGRESS NOTE    Patient is participating in Process group. Group time: 45 minutes    Personal goal for participation: To discussion stressful situations patients are currently or recently experiencing and ways to cope. Goal orientation: Personal    Group therapy participation: minimal    Therapeutic interventions reviewed and discussed: Group discussion was focused on the stressful situations that patients have experienced since admission or just prior. Patients were able to talk about ways they observe stress in their bodies and in the ways they interact with others. Patients were able to share their feelings and emotions and get feedback from others. Impression of participation: Alethea Bojorquez came into the group but declined to sit down. He was animated and restless. He asked this writer questions and then stated it was cold and he needed to get a gown to wear and left. He did not return to group.     Danica Daly LPC [facility]

## 2021-06-29 NOTE — GROUP NOTE
CATHLEEN  GROUP DOCUMENTATION INDIVIDUAL                                                                          Group Therapy Note    Date: 6/29/2021    Group Start Time: 1500  Group End Time: 1600  Group Topic: Recreational/Music Therapy    [facility] - [place] [address] ACUTE BEHAV [facility]    [place], [address] [facility] GROUP DOCUMENTATION GROUP    Group Therapy Note    Attendees: 6         Attendance: Attended    Patient's Goal:  To concentrate on selected task    Interventions/techniques: Supported-crafts,games,music    Follows Directions: Followed directions    Interactions: Disorganized interaction    Mental Status: Manic    Behavior/appearance: Cooperative and Needed prompting    Goals Achieved:  Attended group session      Additional Notes:  Hyper verbal,disorganized thoughts,pleasant/cooperative    Janette Elizabeth

## 2021-06-29 NOTE — PROGRESS NOTES
Problem: Altered Thought Process (Adult/Pediatric)  Goal: *STG: Participates in treatment plan  Outcome: Progressing Towards Goal  Goal: *STG: Remains safe in hospital  Outcome: Progressing Towards Goal  Goal: *STG: Complies with medication therapy  Outcome: Progressing Towards Goal  Goal: *STG: Attends activities and groups  Outcome: Progressing Towards Goal     Problem: Falls - Risk of  Goal: *Absence of Falls  Description: Document Susanne Fall Risk and appropriate interventions in the flowsheet. Outcome: Progressing Towards Goal  Note: Fall Risk Interventions:            Medication Interventions: Teach patient to arise slowly            Verbal shift change report given to Benito Zuleta  (oncoming nurse) by Toñito Langley  (offgoing nurse). Report included the following information SBAR, Kardex, MAR, and Recent Results. Patient denies SI/HI/AVH. Patient denies any anxiety and depression. Patient stated, \" It feels like everything around me is anxious, but I'm good. \" Patient reported \"feeling much better\" since being on unit, and \"feels like I'm getting my life back. \" Patient pleasant upon assessment. Patient hyper verbal but very easily redirectable with staff and peers. Patient pleasant and sociable with peers. Patient asked for tylenol for headache pain rated 3/10. Patient received PRN tylenol at 1231. Patient repeatedly apologized, inappropriate to circumstances. Writer questioned etiology of repetitiveness of saying, \"I'm sorry\" Patient stated that he knows that he talks a lot, but that to him it's not enough, but to his parents, it is too much. Patient continued on to articulate that he is grateful to currently be in a space that allows him to feel out those boundaries safely. Will re-assess patient later in shift for pain. Patient reported around approximately 1400 that he no longer had a headache. No further issues noted at this time.

## 2021-06-29 NOTE — PROGRESS NOTES
1950: Pt denied SI/HI/AH/VH. Pt denied pain. Pt denied anxiety. Pt denied depression. Pt was talking rapidly to writer jumping thought to thought. Pt denied needing anything at this time. 2153: Pt accepted PRN trazodone to help with sleep    2255: Pt began talking with writer about DC plans. Pt asked writer when he could get out but then began talking about a flower in front of his house. Pt is having loose associations and I difficult to follow in conversation. Writer encouraged pt to write in his journal in the day room. Pt agreed to.     6260: Pt in hallway talking with peers. Pt denied needing anything at this time. 0030: Pt in hallway talking with peers. Pt denied needing anything at this time. 0134: Pt appears asleep breathing visible. No issues observed at this time. 0232: Pt appears asleep breathing visible. No issues observed at this time. 9982: Pt appears asleep breathing visible. No issues observed at this time. 3975: Pt appears asleep breathing visible. No issues observed at this time. 0515: Pt appears asleep breathing visible. No issues observed at this time. 8265: Pt accepted scheduled medication. No issues observed at this time. 0617: Pt in day room watching TV with peers. Pt denied needing anything at this time. Pt slept 2.5 hours      Problem: Altered Thought Process (Adult/Pediatric)  Goal: *STG: Participates in treatment plan  Outcome: Progressing Towards Goal  Goal: *STG: Remains safe in hospital  Outcome: Progressing Towards Goal  Goal: *STG: Decreased delusional thinking  Outcome: Progressing Towards Goal  Goal: *STG: Decreased hallucinations  Outcome: Progressing Towards Goal  Goal: *STG: Absence of lethality  Outcome: Progressing Towards Goal     Problem: Falls - Risk of  Goal: *Absence of Falls  Description: Document Susanne Fall Risk and appropriate interventions in the flowsheet.   Outcome: Progressing Towards Goal  Note: Fall Risk Interventions: Medication Interventions: Teach patient to arise slowly                   Problem: Discharge Planning  Goal: *Discharge to safe environment  Outcome: Progressing Towards Goal

## 2021-06-29 NOTE — GROUP NOTE
CATHLEEN  GROUP DOCUMENTATION INDIVIDUAL                                                                          Group Therapy Note    Date: 6/29/2021    Group Start Time: 1100  Group End Time: 1200  Group Topic: Topic Group    [facility] [place] 3 ACUTE BEHAV [facility], [address] [facility] GROUP DOCUMENTATION GROUP    Group Therapy Note    Attendees: 8         Attendance: Attended    Patient's Goal:  To participate in board game    Interventions/techniques: Supported-activity        Interactions: Disorganized interaction    Mental Status: Anxious and Preoccupied    Behavior/appearance: Needed prompting    Goals Achieved:        Additional Notes:  Loose thoughts,very short attention span-left early    Anita Waterman

## 2021-06-29 NOTE — BH NOTES
Psychiatric Progress Note    Patient: Steven Winslow MRN: [id number]  SSN: [id number]    YOB: 1995  Age: 27 y.o. Sex: male      Admit Date: 6/24/2021    LOS: 5 days     Subjective:     Steven Winslow  reports feeling well, \"thank you for asking\" and moods are elevated. Denies SI/HI/AH/VH. No aggression or violence. Appropriately interactive and aware. Tolerating medications well. Eating well and sleeping poorly (4hrs). Improving slowly. Committed with continued bizarre behaviors. 6/29 - Steven Winslow reports feeling well and rearranged his room putting the beds together and moving them. Moods are good. Denies SI/HI/AH/VH. No aggression or violence. Bizarre with loose associations. Pleasant, interactive and partially aware. Tolerating medications well.   Eating well and claims sleeping fairly, but staff report very little sleep (2.5hrs last evening)      Objective:     Vitals:    06/27/21 1940 06/28/21 0720 06/28/21 2008 06/29/21 0830   BP: 128/79 136/65 129/74 123/63   Pulse: 83 91 83 90   Resp: 16 18 16 18   Temp: 97.7 °F (36.5 °C) 97.5 °F (36.4 °C) 97.6 °F (36.4 °C) 98 °F (36.7 °C)   SpO2: 97% 100% 100% 99%   Weight:       Height:            Mental Status Exam:   Sensorium  disorganized   Relations cooperative   Eye Contact appropriate   Appearance:  age appropriate   Speech:  hyperverbal, normal volume and pressured   Thought Process: disorganized   Thought Content free of delusions and free of hallucinations   Suicidal ideations none   Mood:  euphoric   Affect:  Elevated   Memory   adequate   Concentration:  impaired   Insight:  limited   Judgment:  fair       MEDICATIONS:  Current Facility-Administered Medications   Medication Dose Route Frequency    [START ON 6/30/2021] ARIPiprazole (ABILIFY) tablet 20 mg  20 mg Oral DAILY    levothyroxine (SYNTHROID) tablet 50 mcg  50 mcg Oral 6am    OLANZapine (ZyPREXA) tablet 5 mg  5 mg Oral Q6H PRN    haloperidol lactate (HALDOL) injection 5 mg  5 mg IntraMUSCular Q6H PRN    benztropine (COGENTIN) tablet 1 mg  1 mg Oral BID PRN    diphenhydrAMINE (BENADRYL) injection 50 mg  50 mg IntraMUSCular BID PRN    hydrOXYzine HCL (ATARAX) tablet 50 mg  50 mg Oral TID PRN    LORazepam (ATIVAN) injection 1 mg  1 mg IntraMUSCular Q4H PRN    traZODone (DESYREL) tablet 50 mg  50 mg Oral QHS PRN    acetaminophen (TYLENOL) tablet 650 mg  650 mg Oral Q4H PRN    magnesium hydroxide (MILK OF MAGNESIA) 400 mg/5 mL oral suspension 30 mL  30 mL Oral DAILY PRN      DISCUSSION:   the risks and benefits of the proposed medication  off label use of an approved drug/prescription discussed with patient     Lab/Data Review: All lab results for the last 24 hours reviewed. No results found for this or any previous visit (from the past 24 hour(s)). Assessment:     Principal Problem:    Bipolar 1 disorder ([facility] [address] (6/25/2021)    Active Problems:    Cannabis abuse (3/11/2016)        Plan:     Continue current care  Collateral information  Medication modification as appropriate  Increase Abilify 20 mg PO Q daily  Disposition planning with social work    I certify that this patient's inpatient psychiatric hospital services furnished since the previous certification were, and continue to be, required for treatment that could reasonably be expected to improve the patient's condition, or for diagnostic study, and that the patient continues to need, on a daily basis, active treatment furnished directly by or requiring the supervision of inpatient psychiatric facility personnel. In addition, the hospital records show that services furnished were intensive treatment services, admission or related services, or equivalent services.   Signed By: Ricardo Bravo MD     June 29, 2021

## 2021-06-29 NOTE — BH NOTES
GROUP THERAPY PROGRESS NOTE    Patient is participating in Coping Skills Group. Group time: 45 minutes    Personal goal for participation: Process changes they are hoping to make for the year by focusing on goals for today. Goal orientation: Personal    Group therapy participation: disruptive    Therapeutic interventions reviewed and discussed: Group discussion was focused [address] to Gabrielle Pollard. Patients then gave a goal that they home to focus on or accomplish today. Impression of participation: Alethea Bojorquez arrived late to group. He came and went from group and would talk to others. He was reminded group was going on and he could sit or leave but he needed to not be disruptive. He reports trying to get others to sign a piece of paper for another person as a good bye card.      Danica Daly LPC [facility]

## 2021-06-30 PROCEDURE — 74011250637 HC RX REV CODE- 250/637: Performed by: NURSE PRACTITIONER

## 2021-06-30 PROCEDURE — 74011250637 HC RX REV CODE- 250/637: Performed by: PSYCHIATRY & NEUROLOGY

## 2021-06-30 PROCEDURE — 65220000003 HC RM SEMIPRIVATE PSYCH

## 2021-06-30 RX ADMIN — ARIPIPRAZOLE 20 MG: 15 TABLET ORAL at 07:51

## 2021-06-30 RX ADMIN — HYDROXYZINE HYDROCHLORIDE 50 MG: 25 TABLET, FILM COATED ORAL at 18:18

## 2021-06-30 RX ADMIN — TRAZODONE HYDROCHLORIDE 50 MG: 50 TABLET ORAL at 22:07

## 2021-06-30 RX ADMIN — LEVOTHYROXINE SODIUM 50 MCG: 50 TABLET ORAL at 05:38

## 2021-06-30 NOTE — BH NOTES
Behavioral Health Treatment Team Note      Patient goal(s) for today: continue taking meds as prescribe, engage in unit activities, participate in hygiene/ADLs, prepare for discharge, follow directions from staff, contact support team  Treatment team focus/goals: continue adjusting meds as needed, discharge planning, update support system     Progress note: Patient reports he slept great and continues to feel great. He presents as hyperverbal. He hopes to discharge or at least move to general unit. MD to place order to move to general. Patient denied SI/HI, AVH. He inquired what he is to do with himself post-discharge. Staff processed with patient regarding caring for self and goal-setting. Patient noted he met with spiritual staff at hospital and enjoyed it- now hopes to reconnect to Yarsani. Tolerating medication well- reports he is feeling better.  Denied issues on the unit.      LOS:  6                       Expected LOS: 7-9     Insurance info/prescription coverage:  VA Medicare (Part A Only)   Date of last family contact:  WILLY mother Magdalena Lacy ([phone])  Family requesting physician contact today:  no  Discharge plan:  Return to home  Guns in the home:  no   Outpatient provider(s):  To be referred to [facility]     Participating treatment team members: SHOSHANA Gambino, Dr. Mis Aguilar MD

## 2021-06-30 NOTE — BH NOTES
GROUP THERAPY PROGRESS NOTE    Patient is participating in coping skills group. Group time: 45 minutes    Personal goal for participation: Learn coping skills to reduce negative emotions    Goal orientation: Personal    Group therapy participation: active    Therapeutic interventions reviewed and discussed: Group discussion on why being bored can be a problem and the consequences of boredom that patient have experienced. Patient discussed issues they have had with being bored and ways they have tried to combat boredom. This lead to discussion of coping skills for negative emotions and ways to feel better that each patient has tried or that they have heard of. Discussion of activities that help with boredom, challenges, potential solutions and plans. Impression of participation: Yolanda Gamez was present in group. He was very active in group and thanked the group for accepting him. He reports that he enjoys the different activities that are offered on this unit and talking with different people. He was very active in group and shared many activities he enjoys and would like to try with the group. He still was active despite attending this same group on the other unit today.     Mehnaz Estrada LPC [facility]

## 2021-06-30 NOTE — PROGRESS NOTES
Pt in [place] [address] participated in spirituality group on creating a secret place and sacred spaces to help them feel secure. Pt was present and engaged for most if not all of this group experience. Melchor Pandey MDiv.  Staff   Request  Support/Spiritual Care Services via [facility]

## 2021-06-30 NOTE — BH NOTES
Psychiatric Progress Note    Patient: Ilene Malone MRN: [id number]  SSN: [id number]    YOB: 1995  Age: 27 y.o. Sex: male      Admit Date: 6/24/2021    LOS: 6 days     Subjective:     Ilene Malone  reports feeling well, \"thank you for asking\" and moods are elevated. Denies SI/HI/AH/VH. No aggression or violence. Appropriately interactive and aware. Tolerating medications well. Eating well and sleeping poorly (4hrs). Improving slowly. Committed with continued bizarre behaviors. 6/29 - Ilene Malone reports feeling well and rearranged his room putting the beds together and moving them. Moods are good. Denies SI/HI/AH/VH. No aggression or violence. Bizarre with loose associations. Pleasant, interactive and partially aware. Tolerating medications well. Eating well and claims sleeping fairly, but staff report very little sleep (2.5hrs last evening)    6/30 - Ilene Malone reports occasional friction with other patients that he tries to avoid. Still bizarre in behaviors. Notes feeling well and moods are good. Denies SI/HI/AH/VH.  beds back to normal for room. Enjoyed spirituality group and wants to share that with his family. No aggression or violence. Appropriately interactive and aware. Tolerating medications well. Eating and sleeping fairly.       Objective:     Vitals:    06/28/21 2008 06/29/21 0830 06/29/21 2000 06/30/21 0733   BP: 129/74 123/63 (!) 141/58 117/78   Pulse: 83 90 87 90   Resp: 16 18 18 16   Temp: 97.6 °F (36.4 °C) 98 °F (36.7 °C) 98.6 °F (37 °C) 97.5 °F (36.4 °C)   SpO2: 100% 99% 98% 99%   Weight:       Height:            Mental Status Exam:   Sensorium  disorganized   Relations cooperative   Eye Contact appropriate   Appearance:  age appropriate   Speech:  hyperverbal, normal volume and pressured   Thought Process: disorganized   Thought Content free of delusions and free of hallucinations   Suicidal ideations none   Mood: euphoric   Affect:  Elevated   Memory   adequate   Concentration:  impaired   Insight:  limited   Judgment:  fair       MEDICATIONS:  Current Facility-Administered Medications   Medication Dose Route Frequency    ARIPiprazole (ABILIFY) tablet 20 mg  20 mg Oral DAILY    levothyroxine (SYNTHROID) tablet 50 mcg  50 mcg Oral 6am    OLANZapine (ZyPREXA) tablet 5 mg  5 mg Oral Q6H PRN    haloperidol lactate (HALDOL) injection 5 mg  5 mg IntraMUSCular Q6H PRN    benztropine (COGENTIN) tablet 1 mg  1 mg Oral BID PRN    diphenhydrAMINE (BENADRYL) injection 50 mg  50 mg IntraMUSCular BID PRN    hydrOXYzine HCL (ATARAX) tablet 50 mg  50 mg Oral TID PRN    LORazepam (ATIVAN) injection 1 mg  1 mg IntraMUSCular Q4H PRN    traZODone (DESYREL) tablet 50 mg  50 mg Oral QHS PRN    acetaminophen (TYLENOL) tablet 650 mg  650 mg Oral Q4H PRN    magnesium hydroxide (MILK OF MAGNESIA) 400 mg/5 mL oral suspension 30 mL  30 mL Oral DAILY PRN      DISCUSSION:   the risks and benefits of the proposed medication  off label use of an approved drug/prescription discussed with patient     Lab/Data Review: All lab results for the last 24 hours reviewed. No results found for this or any previous visit (from the past 24 hour(s)).       Assessment:     Principal Problem:    Bipolar 1 disorder ([facility] [address] (6/25/2021)    Active Problems:    Cannabis abuse (3/11/2016)        Plan:     Continue current care  Collateral information  Medication modification as appropriate  Continue Abilify 20 mg PO Q daily  Move to general  Disposition planning with social work    I certify that this patient's inpatient psychiatric hospital services furnished since the previous certification were, and continue to be, required for treatment that could reasonably be expected to improve the patient's condition, or for diagnostic study, and that the patient continues to need, on a daily basis, active treatment furnished directly by or requiring the supervision of inpatient psychiatric facility personnel. In addition, the hospital records show that services furnished were intensive treatment services, admission or related services, or equivalent services.   Signed By: Destinee Gomez MD     June 30, 2021

## 2021-06-30 NOTE — BH NOTES
0800 Report given from [facility]. Pt is visible on the unit. VSS. 1000 Pt is visible on the unit. Hyper verbal. Rapid. Pressured speech. Pt is accepting meals and medications. encouraged to attend groups. Pt requires redirection. 1200 pt is visible on the unit. Ate lunch. 1400 pt is visible on the unit. Pt is socializing with peers. 1600 pt is visible on the unit. Calm and cooperative. 1800 pt ate dinner. Pt is visible on the unit. Pt is hyper verbal and restless. Pt states he is upset due to his parents not visiting today. Pt requested and was given hydroxyzine 50 mg po for anxiety.  Pt went into room to rest.

## 2021-06-30 NOTE — BH NOTES
GROUP THERAPY PROGRESS NOTE    Patient is participating in coping skills group. Group time: 45 minutes    Personal goal for participation: Learn coping skills to reduce negative emotions    Goal orientation: Personal    Group therapy participation: active    Therapeutic interventions reviewed and discussed: Group discussion on why being bored can be a problem and the consequences of boredom that patient have experienced. Patient discussed issues they have had with being bored and ways they have tried to combat boredom. This lead to discussion of coping skills for negative emotions and ways to feel better that each patient has tried or that they have heard of. Discussion of activities that help with boredom, challenges, potential solutions and plans. Impression of participation: Yolanda Gamez was present and active in group. He realized that some activities fit into multiple categories. He also was interested in ways to find activities that fit into multiple categories. He shared that he enjoys sitting and contemplating things when he is bored.     Mehnaz Estrada LPC [facility]

## 2021-06-30 NOTE — PROGRESS NOTES
2000: Pt denied SI/HI/AH/VH. Pt denied pain. PT denied anxiety. Pt denied depression. Pt stated \"I had a good day, do you think I could move to general?\" Writer educated pt that he would need to talk to the doctor in the morning. Pt verbalized acceptance. Pt denied needing anything at this time. 2130: Pt in hallway talking to peers. Denied needing anything at this time. 2153: Pt accepted PRN Trazodone. 2200: Gave off shift report to charge nurse           Problem: Altered Thought Process (Adult/Pediatric)  Goal: *STG: Participates in treatment plan  Outcome: Progressing Towards Goal  Goal: *STG: Remains safe in hospital  Outcome: Progressing Towards Goal  Goal: *STG: Complies with medication therapy  Outcome: Progressing Towards Goal  Goal: *STG: Decreased delusional thinking  Outcome: Progressing Towards Goal  Goal: *STG: Decreased hallucinations  Outcome: Progressing Towards Goal  Goal: *STG: Absence of lethality  Outcome: Progressing Towards Goal     Problem: Falls - Risk of  Goal: *Absence of Falls  Description: Document Ssuanne Fall Risk and appropriate interventions in the flowsheet.   Outcome: Progressing Towards Goal  Note: Fall Risk Interventions:            Medication Interventions: Teach patient to arise slowly                   Problem: Discharge Planning  Goal: *Discharge to safe environment  Outcome: Progressing Towards Goal

## 2021-06-30 NOTE — GROUP NOTE
CATHLEEN  GROUP DOCUMENTATION INDIVIDUAL                                                                          Group Therapy Note    Date: 6/30/2021    Group Start Time: 1000  Group End Time: 1100  Group Topic: Topic Group    [facility] [place] [facility] ACUTE BEHAV [facility]    [place], [id number] GROUP    Group Therapy Note    Attendees: 6         Attendance: Did not attend    Patient's Goal:      Interventions/techniques:Jeniffer Orellana

## 2021-06-30 NOTE — BH NOTES
GROUP THERAPY PROGRESS NOTE    Patient is participating in Discharge Planning Group. Group time: 30 minutes    Personal goal for participation: Process feelings related to discharge and/or feelings/goals for today. Goal orientation: Personal    Group therapy participation: active    Therapeutic interventions reviewed and discussed: Group discussion was focused on discharge plans and anxiety related to this. Group members discussed what they planned to do once discharge and discharge plans. Patients discussed their goals for today and what they are working on with treatment team to get ready for discharge. Impression of participation: Tory Nose was present in group. He presented with elevated mood and affect. He reports his goal today is to be discharged and that he is feeling great. He reports that he is wanting to talk about anatomy and was reminded this was discharge group. He was easily redirectable but was restless and in constant motion.     Linda Evans LPC [facility]

## 2021-06-30 NOTE — PROGRESS NOTES
Spiritual Care Assessment/Progress Note  [facility]      NAME: Ro Pool      MRN: [id number]  AGE: 27 y.o.  SEX: male  Buddhism Affiliation: No preference   Language: English     6/30/2021     Total Time (in minutes): 13     Spiritual Assessment begun in [address] through conversation with:         [x]Patient        [] Family    [] Friend(s)        Reason for Consult: Initial/Spiritual assessment, patient floor     Spiritual beliefs: (Please include comment if needed)     [x] Identifies with a rodrigue tradition:  Yazidi orientation       [] Supported by a rodrigue community:            [] Claims no spiritual orientation:           [] Seeking spiritual identity:                [] Adheres to an individual form of spirituality:           [] Not able to assess:                           Identified resources for coping:      [] Prayer                               [] Music                  [] Guided Imagery     [x] Family/friends                 [] Pet visits     [] Devotional reading                         [] Unknown     [] Other:                                               Interventions offered during this visit: (See comments for more details)    Patient Interventions: Affirmation of rodrigue, Catharsis/review of pertinent events in supportive environment, Coping skills reviewed/reinforced, Initial/Spiritual assessment, patient floor, Life review/legacy, Prayer (assurance of)           Plan of Care:     [x] Support spiritual and/or cultural needs    [] Support AMD and/or advance care planning process      [] Support grieving process   [] Coordinate Rites and/or Rituals    [] Coordination with community clergy   [] No spiritual needs identified at this time   [] Detailed Plan of Care below (See Comments)  [] Make referral to Music Therapy  [] Make referral to Pet Therapy     [] Make referral to Addiction services  [] Make referral to [facility]  [] Make referral to Spiritual Care Partner  [] No future visits requested        [x] Follow up upon further referrals     Comments: Provided support for this pt in [address] [place] [address] [phone]. Facilitated life review to assess potential support needs or coping strategies. Provided listening presence as pt shared about his rodrigue and how it motivates him. Pt shared longings to return to [employer] and the embrace he feels from that setting. Pt also shared of friend who offers motivation and support, but stated he's not been able to be in touch with friend since his admission. Assured pt of prayers and affirmed ongoing availability of support. Karon Sequeira MDiv.  Staff   Request  Support/Spiritual Care Services via [address] [place] [address]

## 2021-06-30 NOTE — GROUP NOTE
CATHLEEN  GROUP DOCUMENTATION INDIVIDUAL                                                                          Group Therapy Note    Date: 6/30/2021    Group Start Time: 1500  Group End Time: 1600  Group Topic: Recreational/Music Therapy    [facility] [place] [address] ACUTE BEHAV [facility]    [place]me[place] [facility] GROUP    Group Therapy Note    Attendees: 5         Attendance: Did not attend    Patient's Goal:      Interventions/techniques:Jeniffer Orellana

## 2021-07-01 PROCEDURE — 74011250637 HC RX REV CODE- 250/637: Performed by: NURSE PRACTITIONER

## 2021-07-01 PROCEDURE — 65220000003 HC RM SEMIPRIVATE PSYCH

## 2021-07-01 PROCEDURE — 74011250637 HC RX REV CODE- 250/637: Performed by: PSYCHIATRY & NEUROLOGY

## 2021-07-01 RX ORDER — ARIPIPRAZOLE 400 MG
400 KIT INTRAMUSCULAR
Status: DISCONTINUED | OUTPATIENT
Start: 2021-07-01 | End: 2021-07-02 | Stop reason: HOSPADM

## 2021-07-01 RX ADMIN — ARIPIPRAZOLE 400 MG: KIT at 17:49

## 2021-07-01 RX ADMIN — TRAZODONE HYDROCHLORIDE 50 MG: 50 TABLET ORAL at 21:10

## 2021-07-01 RX ADMIN — ARIPIPRAZOLE 20 MG: 15 TABLET ORAL at 08:31

## 2021-07-01 RX ADMIN — LEVOTHYROXINE SODIUM 50 MCG: 50 TABLET ORAL at 06:18

## 2021-07-01 RX ADMIN — HYDROXYZINE HYDROCHLORIDE 50 MG: 25 TABLET, FILM COATED ORAL at 08:31

## 2021-07-01 NOTE — PROGRESS NOTES
Problem: Altered Thought Process (Adult/Pediatric)  Goal: *STG: Participates in treatment plan  Outcome: Progressing Towards Goal  Goal: *STG: Remains safe in hospital  Outcome: Progressing Towards Goal  Goal: *STG: Seeks staff when feelings of anxiety and fear arise  Outcome: Progressing Towards Goal  Goal: *STG: Complies with medication therapy  Outcome: Progressing Towards Goal  Goal: *STG: Attends activities and groups  Outcome: Progressing Towards Goal  Goal: *STG: Decreased delusional thinking  Outcome: Progressing Towards Goal  Goal: *STG: Decreased hallucinations  Outcome: Progressing Towards Goal  Goal: *STG: Absence of lethality  Outcome: Progressing Towards Goal  Goal: *STG: Demonstrates ability to understand and use improved judgment in daily activities and relationships  Outcome: Progressing Towards Goal  Goal: *LTG: Returns to baseline functioning  Outcome: Progressing Towards Goal  Goal: Interventions  Outcome: Progressing Towards Goal     Patient alert and oriented X4. He has been out on the unit interacting with peers and staff. Patient denied depression, anxiety, SI/HI, AVH, and pain at this time. He appears anxious and irritable,. Patient stated, \"I'm ready to go home. \" No ordered PM medications. No PRNs given, vital signs stable and Q15 minute checks continue to maintain safety. RN will continue to monitor.

## 2021-07-01 NOTE — PROGRESS NOTES
0800 After receiving nursing report from previous shift. VS taken. Breakfast trays given and patient ate and tolerated appetite well. Pt denies SI/HI/AVH. Staff will continue to monitor throughout the day. 1000 Patient asked about discharge proceedings. Case management stated that potential date for discharge will be 7/2/21.    1600 Patient eating lunch with peers in the day room. Pt pleasant during this time. 1749 Patient received Abilify maintena. Tolerated medication well. No issues or concerns noted.

## 2021-07-01 NOTE — GROUP NOTE
CATHLEEN  GROUP DOCUMENTATION INDIVIDUAL                                                                          Group Therapy Note    Date: 7/1/2021    Group Start Time: 1000  Group End Time: 1100  Group Topic: Topic Group    [facility] [place] ACUTE BE[facility]    [place], [address] [facility] GROUP DOCUMENTATION GROUP    Group Therapy Note    Attendees: 7         Attendance: Attended    Patient's Goal:  To participate in relaxation activity    Interventions/techniques: Supported-group project        Interactions: Disorganized interaction    Mental Status: Anxious    Behavior/appearance: Cooperative and Needed prompting    Goals Achieved:  Attended group session      Additional Notes:  Disorganized thoughts,anxious-short attention span    [place]

## 2021-07-01 NOTE — GROUP NOTE
CATHLEEN  GROUP DOCUMENTATION INDIVIDUAL                                                                          Group Therapy Note    Date: 7/1/2021    Group Start Time: 0900  Group End Time: 1000  Group Topic: Topic Group    [facility] [place] [address] ACUTE BEHAV [facility]    [place], [address] [facility] GROUP DOCUMENTATION GROUP    Group Therapy Note  7 Attendees       Attendance: Attended    Patient's Goal:  To participate in relaxation activity    Interventions/techniques: Supported-music        Interactions: Disorganized interaction    Mental Status: Anxious    Behavior/appearance: Cooperative and Needed prompting    Goals Achieved:  Attended group session      Additional Notes:  Disorganized thoughts,anniety noted,short attention span    Anita Watreman

## 2021-07-01 NOTE — GROUP NOTE
CATHLEEN  GROUP DOCUMENTATION INDIVIDUAL                                                                          Group Therapy Note    Date: 7/1/2021    Group Start Time: 1500  Group End Time: 1600  Group Topic: Recreational/Music Therapy    [facility] [place] [address] ACUTE BEHAV [facility]    [place], [address] [facility] GROUP DOCUMENTATION GROUP    Group Therapy Note    Attendees: 8         Attendance: Attended    Patient's Goal:  To concentrate on selected task    Interventions/techniques: Supported-crafts,games,music        Interactions: Disorganized interaction    Mental Status: Anxious    Behavior/appearance: Needed prompting    Goals Achieved:  Attended group session-worked on selected task      Additional Notes:  Disorganized thoughts,hyper verbal,intrusive at times    Gregoria Roberts

## 2021-07-01 NOTE — BH NOTES
Psychiatric Progress Note    Patient: Miranda Arshad MRN: [id number]  SSN: [id number]    YOB: 1995  Age: 27 y.o. Sex: male      Admit Date: 6/24/2021    LOS: 7 days     Subjective:     Miranda Arshad  reports feeling well, \"thank you for asking\" and moods are elevated. Denies SI/HI/AH/VH. No aggression or violence. Appropriately interactive and aware. Tolerating medications well. Eating well and sleeping poorly (4hrs). Improving slowly. Committed with continued bizarre behaviors. 6/29 - Miranda Arshad reports feeling well and rearranged his room putting the beds together and moving them. Moods are good. Denies SI/HI/AH/VH. No aggression or violence. Bizarre with loose associations. Pleasant, interactive and partially aware. Tolerating medications well. Eating well and claims sleeping fairly, but staff report very little sleep (2.5hrs last evening)    6/30 - Miranda Arshad reports occasional friction with other patients that he tries to avoid. Still bizarre in behaviors. Notes feeling well and moods are good. Denies SI/HI/AH/VH.  beds back to normal for room. Enjoyed spirituality group and wants to share that with his family. No aggression or violence. Appropriately interactive and aware. Tolerating medications well. Eating and sleeping fairly. 7/1 - Miranda Arshad reports feeling more calm and at rest.  Moods are good. Remains hyper-verbal but more focused. Denies SI/HI/AH/VH. No aggression or violence. Appropriately interactive and aware. Tolerating medications well. Eating and sleeping fairly.       Objective:     Vitals:    06/29/21 2000 06/30/21 0733 06/30/21 2013 07/01/21 0743   BP: (!) 141/58 117/78 (!) 112/56 120/65   Pulse: 87 90 99 82   Resp: 18 16 16 16   Temp: 98.6 °F (37 °C) 97.5 °F (36.4 °C) 97.9 °F (36.6 °C) 98.4 °F (36.9 °C)   SpO2: 98% 99% 98% 98%   Weight:       Height:            Mental Status Exam:   Sensorium disorganized   Relations cooperative   Eye Contact appropriate   Appearance:  age appropriate   Speech:  hyperverbal, normal volume and pressured   Thought Process: disorganized   Thought Content free of delusions and free of hallucinations   Suicidal ideations none   Mood:  euphoric   Affect:  Elevated   Memory   adequate   Concentration:  impaired   Insight:  limited   Judgment:  fair       MEDICATIONS:  Current Facility-Administered Medications   Medication Dose Route Frequency    ARIPiprazole (ABILIFY) tablet 20 mg  20 mg Oral DAILY    levothyroxine (SYNTHROID) tablet 50 mcg  50 mcg Oral 6am    OLANZapine (ZyPREXA) tablet 5 mg  5 mg Oral Q6H PRN    haloperidol lactate (HALDOL) injection 5 mg  5 mg IntraMUSCular Q6H PRN    benztropine (COGENTIN) tablet 1 mg  1 mg Oral BID PRN    diphenhydrAMINE (BENADRYL) injection 50 mg  50 mg IntraMUSCular BID PRN    hydrOXYzine HCL (ATARAX) tablet 50 mg  50 mg Oral TID PRN    LORazepam (ATIVAN) injection 1 mg  1 mg IntraMUSCular Q4H PRN    traZODone (DESYREL) tablet 50 mg  50 mg Oral QHS PRN    acetaminophen (TYLENOL) tablet 650 mg  650 mg Oral Q4H PRN    magnesium hydroxide (MILK OF MAGNESIA) 400 mg/5 mL oral suspension 30 mL  30 mL Oral DAILY PRN      DISCUSSION:   the risks and benefits of the proposed medication  off label use of an approved drug/prescription discussed with patient     Lab/Data Review: All lab results for the last 24 hours reviewed. No results found for this or any previous visit (from the past 24 hour(s)).       Assessment:     Principal Problem:    Bipolar 1 disorder ([facility] [address] (6/25/2021)    Active Problems:    Cannabis abuse (3/11/2016)        Plan:     Continue current care  Collateral information  Medication modification as appropriate  Continue Abilify 20 mg PO Q daily  Disposition planning with social work    I certify that this patient's inpatient psychiatric hospital services furnished since the previous certification were, and continue to be, required for treatment that could reasonably be expected to improve the patient's condition, or for diagnostic study, and that the patient continues to need, on a daily basis, active treatment furnished directly by or requiring the supervision of inpatient psychiatric facility personnel. In addition, the hospital records show that services furnished were intensive treatment services, admission or related services, or equivalent services.   Signed By: Marine Oseguera MD     July 1, 2021

## 2021-07-01 NOTE — PROGRESS NOTES
Problem: Altered Thought Process (Adult/Pediatric)  Goal: *STG: Participates in treatment plan  Outcome: Progressing Towards Goal  Goal: *STG: Remains safe in hospital  Outcome: Progressing Towards Goal  Goal: *STG: Seeks staff when feelings of anxiety and fear arise  Outcome: Progressing Towards Goal  Goal: *STG: Attends activities and groups  Outcome: Progressing Towards Goal  Goal: *STG: Decreased delusional thinking  Outcome: Progressing Towards Goal  Goal: *STG: Decreased hallucinations  Outcome: Progressing Towards Goal

## 2021-07-01 NOTE — BH NOTES
Behavioral Health Treatment Team Note     Patient goal(s) for today: continue taking meds as prescribe, engage in unit activities, participate in hygiene/ADLs, prepare for discharge, follow directions from staff, contact support team  Treatment team focus/goals: continue adjusting meds as needed, discharge planning, update support system    Progress note: Patient met with treatment team today. He reports feeling good. He reports enjoying the calm energy. He reports no concerns other than when he can go home. He denies SI, HI, and hallucinations. He remains hyperverbal and energetic but has goal directed thoughts and speech. Spoke with mother. She reports that she is looking forward to Lisette coming home. She reports visiting him while he is here and he has been doing better though still talking a lot. She asked about an injectable form of Abilify for the patient and she was informed that treatment team will talk to the patient about this as an option. She shared that she plans to visit [facility] and can come tomorrow at 2pm to  the patient. She was informed of the plan for the patient to follow up with [address] [place] [address] as he had previously. She was also concerned that Lisette may be upset because he saw the obituary for his friend that  while Lisette was getting TDOed. This writer went to speak to [facility] about tomorrow. He was more irritable and short tempered. He is frustrated with his family for not telling him when it happened. He is working on his safety plan which he got upset about because it said suicide and he said for all he knows his friend committed suicide and he was not there to help him so this paper was not helping him. He was open to grief support resources and these will be added to his discharge paperwork.     LOS:  7  Expected LOS: TBD    Insurance info/prescription coverage:  VA Medicare (Part A Only)   Date of last family contact:  WILLY mother Myron Arteaga ([phone])  Family requesting physician contact today:  no  Discharge plan:  Return to home  Guns in the home:  no   Outpatient provider(s):   [facility]     Participating treatment team members: Dr. Marisa Castaneda MD

## 2021-07-01 NOTE — PROGRESS NOTES
Behavioral Services                                              Medicare Re-Certification    I certify that the inpatient psychiatric hospital services furnished since the previous certification/re-certification were, and continue to be, medically necessary for;    [x] (1) Treatment which could reasonably be expected to improve the patient's condition,    [x] (2) Or for diagnostic study. Estimated length of stay/service 5 -7 days    Plan for post-hospital care per social work    This patient continues to need, on a daily basis, active treatment furnished directly by or requiring the supervision of inpatient psychiatric personnel.     Electronically signed by Marine Oseguera MD on 7/1/2021 at 12:26 PM

## 2021-07-02 VITALS
DIASTOLIC BLOOD PRESSURE: 80 MMHG | RESPIRATION RATE: 16 BRPM | OXYGEN SATURATION: 100 % | SYSTOLIC BLOOD PRESSURE: 122 MMHG | BODY MASS INDEX: 20.31 KG/M2 | WEIGHT: 134 LBS | TEMPERATURE: 97.4 F | HEIGHT: 68 IN | HEART RATE: 93 BPM

## 2021-07-02 PROCEDURE — 74011250637 HC RX REV CODE- 250/637: Performed by: PSYCHIATRY & NEUROLOGY

## 2021-07-02 PROCEDURE — 74011250637 HC RX REV CODE- 250/637: Performed by: NURSE PRACTITIONER

## 2021-07-02 RX ORDER — LEVOTHYROXINE SODIUM 50 UG/1
50 TABLET ORAL
Qty: 30 TABLET | Refills: 0 | Status: SHIPPED | OUTPATIENT
Start: 2021-07-03 | End: 2021-07-03

## 2021-07-02 RX ORDER — ARIPIPRAZOLE 400 MG
400 KIT INTRAMUSCULAR
Qty: 400 MG | Refills: 0 | Status: SHIPPED | OUTPATIENT
Start: 2021-07-31 | End: 2021-07-03

## 2021-07-02 RX ORDER — TRAZODONE HYDROCHLORIDE 50 MG/1
50 TABLET ORAL
Qty: 30 TABLET | Refills: 0 | Status: SHIPPED | OUTPATIENT
Start: 2021-07-02 | End: 2021-07-03

## 2021-07-02 RX ORDER — ARIPIPRAZOLE 20 MG/1
20 TABLET ORAL DAILY
Qty: 15 TABLET | Refills: 0 | Status: SHIPPED | OUTPATIENT
Start: 2021-07-03 | End: 2021-07-03

## 2021-07-02 RX ADMIN — ARIPIPRAZOLE 20 MG: 15 TABLET ORAL at 08:11

## 2021-07-02 RX ADMIN — LEVOTHYROXINE SODIUM 50 MCG: 50 TABLET ORAL at 06:35

## 2021-07-02 NOTE — PROGRESS NOTES
Pharmacist Discharge Medication Reconciliation    Discharging Provider: Dr. Esau Thomson PMH: No past medical history on file. Chief Complaint for this Admission: No chief complaint on file. Allergies: Amoxicillin    Discharge Medications:   Current Discharge Medication List        START taking these medications    Details   ARIPiprazole (ABILIFY MAINTENA) 400 mg injection 400 mg by IntraMUSCular route every thirty (30) days. Indications: bipolar disorder in remission  Qty: 400 mg, Refills: 0  Start date: 7/31/2021      ARIPiprazole (ABILIFY) 20 mg tablet Take 1 Tablet by mouth daily. Indications: Bipolar disorder  Qty: 15 Tablet, Refills: 0  Start date: 7/3/2021      levothyroxine (SYNTHROID) 50 mcg tablet Take 1 Tablet by mouth every morning. Indications: a condition with low thyroid hormone levels  Qty: 30 Tablet, Refills: 0  Start date: 7/3/2021      traZODone (DESYREL) 50 mg tablet Take 1 Tablet by mouth nightly as needed for Sleep (For insomnia).  Indications: insomnia associated with depression  Qty: 30 Tablet, Refills: 0  Start date: 7/2/2021             The patient's chart, MAR and AVS were reviewed by Kecia Padilla, PHARMD, BCPS

## 2021-07-02 NOTE — DISCHARGE INSTRUCTIONS
DISCHARGE SUMMARY    NAME:Mike Craig  : 1995  MRN: [id number]    The patient Clara Monahan exhibits the ability to control behavior in a less restrictive environment. Patient's level of functioning is improving. No assaultive/destructive behavior has been observed for the past 24 hours. No suicidal/homicidal threat or behavior has been observed for the past 24 hours. There is no evidence of serious medication side effects. Patient has not been in physical or protective restraints for at least the past 24 hours. If weapons involved, how are they secured? No weapons involved. Is patient aware of and in agreement with discharge plan? Yes     Arrangements for medication:  Prescriptions given to patient. Copy of discharge instructions to provider?:  [address] for transportation home:  Family     Keep all follow up appointments as scheduled, continue to take prescribed medications per physician instructions. Mental health crisis number:  857 or your local mental health crisis line number at [phone]. Mental Health Emergency WARM LINE      2-337-627-MHAV 5291)      M-F: 9am to 9pm      Sat & Sun: 5pm - 9pm  National suicide prevention lines:                             1-631-NXLIDRN (6-877-208-002-893-7525)       0-442-872-TALK (4-135-735-166.529.6383)    Crisis Text Line:  Text HOME to 413147    Patient Education        Bipolar Disorder in Children: Care Instructions  Your Care Instructions     Bipolar disorder is sometimes called manic depression. It is an illness that causes extreme mood changes. Moods go from times of very high energy (manic episodes) to times of depression. These moods may cause problems with your child's schooling, family life, friendships, and ability to function. There is no cure for bipolar disorder. But it can be helped with medicines. Counseling may also help.  It is important for your child to take his or her medicines exactly as prescribed, even when he or she feels well. Your child may need lifelong treatment. Follow-up care is a key part of your child's treatment and safety. Be sure to make and go to all appointments, and call your doctor if your child is having problems. It's also a good idea to know your child's test results and keep a list of the medicines your child takes. How can you care for your child at home? · Give your child medicines exactly as prescribed. Do not stop or change a medicine without talking to your doctor first. Your child may need to try different combinations of medicines to find what works best.  · Give your child's medicines on schedule to keep your child's moods even. When your child feels good, you may think that he or she does not need the medicines, but it is important that your child keeps taking them. · Make sure your child goes to his or her counseling sessions. Call and talk with your child's counselor if your child cannot go to a session or does not think the sessions are helping. Do not just let your child stop going. · Have your child get at least 30 minutes of activity on most days of the week. Walking is a good choice. You also may want your child to do other things, such as running, swimming, or cycling. · Make sure your child gets enough sleep. Keep your child's room dark and quiet, and try to have your child go to bed at the same time every night. · Make sure your child eats a healthy diet. A healthy diet includes whole grains, dairy, fruits and vegetables, and protein. Have your child eat foods from each of these groups. · Try to lower your child's stress. Help manage your child's time and help him or her lead a healthy lifestyle. To lower your child's stress, have him or her try physical activity or slow deep breathing. · Make sure your child does not use alcohol or illegal drugs. · Learn the early signs of your child's mood changes so you can take steps to help your child feel better.   · Encourage your child to ask for help from friends and family when he or she needs it. Your child may need help with daily chores when he or she is depressed. When your child is manic, he or she may need support to control high energy levels. What should you do if your child has bipolar disorder? · Learn about bipolar disorder and signs that the problem is getting worse. · Remind your child that you love him or her. · Make a plan with all family members about how to take care of your child when his or her symptoms are bad. · Talk about your fears and concerns and those of other family members. Seek counseling if needed. · Do not focus attention only on your child who is in treatment. · Remind yourself that it will take time for changes to occur. · Do not blame yourself for your child's condition. · Know your legal rights and the legal rights of your child. Support groups or counselors can help you with this information. · Take care of yourself. Keep up with your own interests, such as your career, hobbies, and friends. Use exercise, positive self-talk, deep breathing, and other relaxing exercises to help lower your stress. · Give yourself time to grieve. You may need to deal with emotions such as anger, fear, and frustration. After you work through your feelings, you will be better able to care for yourself and your child. · If you are having a hard time with your feelings or with your relationship with your child, talk with a counselor. When should you call for help? Call 911 anytime you think your child may need emergency care. For example, call if:    · Your child feels like hurting himself or herself or someone else.     · Your child displays dangerous behavior, and you think your child might hurt himself or herself or someone else. Call your doctor now or seek immediate medical care if:    · Your child hears voices.     · Your child talks about suicide.  Keep the numbers for these national suicide hotlines: 4-199-359-TALK (2-526.469.9957) and 8-848-WXPPZKM (0-734.208.6563). If a suicide threat seems real, with a specific plan and a way to carry it out, stay with your child, or ask someone you trust to stay with your child, until you can get help.     · Your child has bipolar disorder and:  ? Starts to give away possessions. ? Is using illegal drugs or drinking alcohol heavily. ? Talks or writes about death, including writing suicide notes or talking about guns, knives, or pills. ? Talks or writes about hurting someone else. ? Starts to spend a lot of time alone. ? Acts very aggressively or suddenly appears calm. ? Talks about beliefs that are not based in reality (delusions). Watch closely for changes in your child's health, and be sure to contact your doctor if:    · Your child cannot go to his or her counseling sessions. Where can you learn more? Go to http://www.gray.com/  Enter N336 in the search box to learn more about \"Bipolar Disorder in Children: Care Instructions. \"  Current as of: September 23, 2020               Content Version: 12.8  © 2006-2021 [employer]wise[employer] Inc[employer]. Care instructions adapted under license by [employer] (which disclaims liability or warranty for this information). If you have questions about a medical condition or this instruction, always ask your healthcare professional. [place] [address] any warranty or liability for your use of this information. Anila Dunaway .If I feel I am at risk of hurting myself or others, I will call the crisis office and speak with a crisis worker who will assist me during my crisis. Anila Dunaway .Zohreh Ortiz 36 [phone]  [address] [place] [address]  [phone]

## 2021-07-02 NOTE — PROGRESS NOTES
Problem: Altered Thought Process (Adult/Pediatric)  Goal: *STG: Participates in treatment plan  Outcome: Progressing Towards Goal

## 2021-07-02 NOTE — BH NOTES
1930 Assumed care of patient from day shift nurse. The patient is visible out in the day room. Patient is social with peers. Patient has rapid pressured speech. Good eye contact. Currently denies SI/HI/AVH and anxiety. Patient rates his current depression 2/10.    2110  The patient requested PRN trazodone for sleep. Medication administered. 0100 Patient up in the day room. He is inquiring about shipping packages. Patient is hyper verbal, at times loose associations. No voiced concerns at this time. Will continue to monitor. 0600 Nursing rounds completed.  Patient slept for 3.5 hours

## 2021-07-02 NOTE — SUICIDE SAFETY PLAN
SAFETY PLAN    A suicide Safety Plan is a document that supports someone when they are having thoughts of suicide. Warning Signs that indicate a suicidal crisis may be developing: What (situations, thoughts, feelings, body sensations, behaviors, etc.) do you experience that lets you know you are beginning to think about suicide? 1. \"Being around toxic people for about just a week\"  2. \"I don't know of or aware of any\"    Internal Coping Strategies:  What things can I do (relaxation techniques, hobbies, physical activities, etc.) to take my mind off my problems without contacting another person? 1. \"Breathing\"  2. \"Coming to [facility]\"  3. \"Not ever going to the [employer] and social settings that provide distraction: Who can I call or where can I go to distract me? 1. Name: Florence Costa   Phone: "[employer] phone  2. Name: Radha Graff  Phone: "[employer] phone   3. Place: [employer]            4. Place: [employer]    People whom I can ask for help: Who can I call when I need help - for example, friends, family, clergy, someone else? 1. Name: Florence Costa   Phone: "[employer] phone  2. Name: Radha Graff  Phone: "[employer] phone     Professionals or Behavioral Health agencies I can contact during a crisis: Who can I call for help - for example, my doctor, my psychiatrist, my psychologist, a mental health provider, a suicide hotline? 1. Clinician Name: Mary Li   Phone: [phone]      Clinician Pager or Emergency Contact #: [phone]    2. Suicide Prevention Lifeline: 3-372-998-TALK (8990)    3. [address] [place] [address] Emergency Services -  for example, [address] [facility] suicide hotline, [facility] Hotline: [facility]      Emergency Services Address: [place] [address]      Emergency Services Phone: [phone]    Making the environment safe: How can I make my environment (house/apartment/living space) safer?  For example, can I remove guns, medications, and other items? 1. \"Talk to somebody, express one's self\"  2.  \"Just talk to somebody\"

## 2021-07-02 NOTE — BH NOTES
Behavioral Health Transition Record to Provider    Patient Name: Lindsey Lennox  YOB: 1995  Medical Record Number: [id number]  Date of Admission: 6/24/2021  Date of Discharge: 07/02/2021    Attending Provider: Juliana Morse MD  Discharging Provider: Juliana Morse MD  To contact this individual call [phone] and ask the  to page. If unavailable, ask to be transferred to [address] [place] [address] Provider on call. [facility] Provider will be available on call 24/7 and during holidays. Primary Care Provider: Frank Chua MD    Allergies   Allergen Reactions    Amoxicillin Rash       Reason for Admission: 53-year-old  male with a history of schizophrenia, brought in under TDO due to being agitated, violent threats toward family, homicidal ideations against his father. He is verbally threatening, bizarre in his actions, dramatic in his behavior secondary to paranoid perseverations. He stopped taking his medications back in 02/2021 and it has been a downhill course ever since per reports. The patient explained he had a disagreement, he wanted to better work with people, so he can make better decisions for himself. He denies suicidal or homicidal ideations and no auditory or visual hallucinations. Here for management of his condition.     Admission Diagnosis: B[facility] 1 [facility] ([facility] [F31.9]    * No surgery found *    Results for orders placed or performed during the hospital encounter of 06/24/21   TSH 3RD GENERATION   Result Value Ref Range    TSH 5.43 (H) 0.36 - 3.74 uIU/mL   LIPID PANEL   Result Value Ref Range    Cholesterol, total 128 <200 MG/DL    Triglyceride 63 <150 MG/DL    HDL Cholesterol 52 MG/DL    LDL, calculated 63.4 0 - 100 MG/DL    VLDL, calculated 12.6 MG/DL    CHOL/HDL Ratio 2.5 0.0 - 5.0     HEMOGLOBIN A1C WITH EAG   Result Value Ref Range    Hemoglobin A1c 5.1 4.0 - 5.6 %    Est. average glucose 100 mg/dL       Immunizations administered during this encounter: There is no immunization history on file for this patient. Screening for Metabolic Disorders for Patients on Antipsychotic Medications  (Data obtained from the EMR)    Estimated Body Mass Index  Estimated body mass index is 20.37 kg/m² as calculated from the following:    Height as of this encounter: 5' 8\" (1.727 m). Weight as of this encounter: 60.8 kg (134 lb). Vital Signs/Blood Pressure  Visit Vitals  /80 (BP Patient Position: Supine)   Pulse 93   Temp 97.4 °F (36.3 °C)   Resp 16   Ht 5' 8\" (1.727 m)   Wt 60.8 kg (134 lb)   SpO2 100%   BMI 20.37 kg/m²       Blood Glucose/Hemoglobin A1c  Lab Results   Component Value Date/Time    Glucose 91 2021 01:34 PM       Lab Results   Component Value Date/Time    Hemoglobin A1c 5.1 2021 04:42 AM        Lipid Panel  Lab Results   Component Value Date/Time    Cholesterol, total 128 2021 04:42 AM    HDL Cholesterol 52 2021 04:42 AM    LDL, calculated 63.4 2021 04:42 AM    Triglyceride 63 2021 04:42 AM    CHOL/HDL Ratio 2.5 2021 04:42 AM        Discharge Diagnosis: See physician summary    Discharge Plan: Return home with parents with outpatient follow up    Mike  : 1995  MRN:          [id number]     The patient Jw Zuniga exhibits the ability to control behavior in a less restrictive environment. Patient's level of functioning is improving. No assaultive/destructive behavior has been observed for the past 24 hours. No suicidal/homicidal threat or behavior has been observed for the past 24 hours. There is no evidence of serious medication side effects. Patient has not been in physical or protective restraints for at least the past 24 hours.     If weapons involved, how are they secured? No weapons involved.      Is patient aware of and in agreement with discharge plan?  Yes      Arrangements for medication:  Prescriptions given to patient.     Copy of discharge instructions to provider?:  [address] for transportation home:  Family      Keep all follow up appointments as scheduled, continue to take prescribed medications per physician instructions. Mental health crisis number:  115 or your local mental health crisis line number at [phone]. Discharge Medication List and Instructions: There are no discharge medications for this patient. Unresulted Labs (24h ago, onward)    None        To obtain results of studies pending at discharge, please contact [phone]    Follow-up Information     Follow up With Specialties Details Why [address] [facility]  Go on 7/6/2021 Please go to [facility] in intake appointments between 9:00AM and 2:00PM (Mon-Fri) to begin process of receiving mental health medication, case management and therapy services. [address]  Call Please call to inquire about grief supports  Del Ackerman [address]  [address]  [phone]          Advanced Directive:   Does the patient have an appointed surrogate decision maker? No  Does the patient have a Medical Advance Directive? No  Does the patient have a Psychiatric Advance Directive? No  If the patient does not have a surrogate or Medical Advance Directive AND Psychiatric Advance Directive, the patient was offered information on these advance directives Patient will complete at a later time    Patient Instructions: Please continue all medications until otherwise directed by physician. Tobacco Cessation Discharge Plan:   Is the patient a smoker and needs referral for smoking cessation? No  Patient referred to the following for smoking cessation with an appointment? No     Patient was offered medication to assist with smoking cessation at discharge? No  Was education for smoking cessation added to the discharge instructions? No    Alcohol/Substance Abuse Discharge Plan:   Does the patient have a history of substance/alcohol abuse and requires a referral for treatment? Not applicable  Patient referred to the following for substance/alcohol abuse treatment with an appointment? Not applicable  Patient was offered medication to assist with alcohol cessation at discharge? Not applicable  Was education for substance/alcohol abuse added to discharge instructions? Not applicable    Patient discharged to Home; discussed with patient/caregiver and provided to the patient/caregiver either in hard copy or electronically.

## 2021-07-02 NOTE — PROGRESS NOTES
Problem: Altered Thought Process (Adult/Pediatric)  Goal: *STG: Complies with medication therapy  Outcome: Progressing Towards Goal

## 2021-07-02 NOTE — BH NOTES
0700-Report received from  [address] [place] [address].  [id number]- Patient in no acute distress, he denies SI/HI at this time, he is hyper verbal at this time. He states he is alright  No AH at this time. He continues to ambulate in the hallway. [id number] This patient  met with the team and has plans to discharge at this time.

## 2021-07-02 NOTE — GROUP NOTE
CATHLEEN  GROUP DOCUMENTATION INDIVIDUAL                                                                          Group Therapy Note    Date: 7/2/2021    Group Start Time: 1000  Group End Time: 1100  Group Topic: Topic Group    [facility] 3 ACUTE BEHAV [facility]    [place], [address] [facility] GROUP DOCUMENTATION GROUP    Group Therapy Note    Attendees: 7         Attendance: Attended    Patient's Goal:  To participate in relaxation activity    Interventions/techniques: Supported-art    Follows Directions: Followed directions    Interactions: Disorganized interaction    Mental Status: Anxious    Behavior/appearance: Cooperative and Needed prompting    Goals Achieved:  Active participant      Additional Notes:  Disorganized thoughts-short attention span     Halo

## 2021-07-02 NOTE — DISCHARGE SUMMARY
PSYCHIATRIC DISCHARGE SUMMARY         IDENTIFICATION:    Patient Name  Femi Chanel   Date of Birth 1995   [facility] [id number]   Medical Record Number  [id number]      Age  27 y.o. PCP Laz Grace MD   Admit date:  6/24/2021    Discharge date: 7/2/2021   Room Number  327/02  @ [facility]   Date of Service  7/2/2021            TYPE OF DISCHARGE: REGULAR               CONDITION AT DISCHARGE: improved       PROVISIONAL & DISCHARGE DIAGNOSES:    Problem List  Date Reviewed: 5/19/2016        Codes Class    * (Principal) Bipolar 1 disorder ([facility] ICD-10-CM: F31.9  ICD-9-CM: 296.7         Bipolar disorder, curr episode mixed, severe, with psychotic features ([facility] [address] [facility] ICD-10-CM: F31.64  ICD-9-CM: 296.64         Cannabis abuse ICD-10-CM: F12.10  ICD-9-CM: 305.20               Active Hospital Problems    *[facility]      Cannabis abuse        DISCHARGE DIAGNOSIS:   Axis I:  SEE ABOVE  Axis II: SEE ABOVE  Axis III: SEE ABOVE  Axis IV:  lack of structure  Axis V:  <50 on admission, 55+ on discharge     CC & HISTORY OF PRESENT ILLNESS:  28-year-old  male with a history of schizophrenia, brought in under TDO due to being agitated, violent threats toward family, homicidal ideations against his father. He is verbally threatening, bizarre in his actions, dramatic in his behavior secondary to paranoid perseverations. He stopped taking his medications back in 02/2021 and it has been a downhill course ever since per reports. The patient explained he had a disagreement, he wanted to better work with people, so he can make better decisions for himself. He denies suicidal or homicidal ideations and no auditory or visual hallucinations. Here for management of his condition.      SOCIAL HISTORY:    Social History     Socioeconomic History    Marital status: SINGLE     Spouse name: Not on file    Number of children: Not on file    Years of education: Not on file    Highest education level: Not on file   Occupational History    Not on file   Tobacco Use    Smoking status: Current Every Day Smoker   Substance and Sexual Activity    Alcohol use: Yes    Drug use: Not on file    Sexual activity: Not on file   Other Topics Concern    Not on file   Social History Narrative    Not on file     Social Determinants of Health     Financial Resource Strain:     Difficulty of Paying Living Expenses:    Food Insecurity:     Worried About Running Out of Food in the Last Year:     [address] in the Last Year:    Transportation Needs:     Lack of Transportation (Medical):  Lack of Transportation (Non-Medical):    Physical Activity:     Days of Exercise per Week:     Minutes of Exercise per Session:    Stress:     Feeling of Stress :    Social Connections:     Frequency of Communication with Friends and Family:     Frequency of Social Gatherings with Friends and Family:     Attends Yazdan[employer] Services:     Active Member of Clubs or Organizations:     Attends Club or Organization Meetings:     Marital Status:    Intimate Partner Violence:     Fear of Current or Ex-Partner:     Emotionally Abused:     Physically Abused:     Sexually Abused:       FAMILY HISTORY:   Family History   Problem Relation Age of Onset    Drug Abuse Neg Hx     Psychiatric Disorder Neg Hx              HOSPITALIZATION COURSE:    Berta Mak was admitted to the inpatient psychiatric unit [facility] for acute psychiatric stabilization in regards to symptomatology as described in the HPI above. The differential diagnosis at time of admission included: schizophrenia vs substance induced psychotic disorder schizoaffective vs bipolar vs adjustment disorder. While on the unit Berta Mak was involved in individual, group, occupational and milieu therapy. Psychiatric medications were adjusted during this hospitalization.   Berta Mak demonstrated a progressive improvement in overall condition. Much of patient's initial presentation appeared to be related to situational stressors, effects of medication non-compliance and psychological factors. Please see individual progress notes for more specific details regarding patient's hospitalization course. Marla Clark reports doing better overall and moods are good. Denies SI/HI/AH/VH. No aggression or violence. Appropriately interactive and aware. Tolerating medications well. Eating and sleeping fairly. Patient with request for discharge today. There are no grounds to seek a TDO. At time of discharge, Marla Clark is without significant problems of depression, psychosis, or natalee. Patient free of suicidal and homicidal ideations (appears to be at very low risk of suicide or homicide) and reports many positive predictive factors in terms of not attempting suicide or homicide. Overall presentation at time of discharge is most consistent with the diagnosis of paranoid  Schizophrenia. Patient has maximized benefit to be derived from acute inpatient psychiatric treatment. All members of the treatment team concur with each other in regards to plans for discharge today. Patient and family are aware and in agreement with discharge and discharge plan.          LABS AND IMAGAING:    Labs Reviewed   TSH 3RD GENERATION - Abnormal; Notable for the following components:       Result Value    TSH 5.43 (*)     All other components within normal limits   LIPID PANEL   HEMOGLOBIN A1C WITH EAG     Lab Results   Component Value Date/Time    Valproic acid <3 (L) 06/24/2021 01:34 PM     Admission on 06/24/2021   Component Date Value Ref Range Status    TSH 06/26/2021 5.43* 0.36 - 3.74 uIU/mL Final    Cholesterol, total 06/28/2021 128  <200 MG/DL Final    Triglyceride 06/28/2021 63  <150 MG/DL Final    HDL Cholesterol 06/28/2021 52  MG/DL Final    LDL, calculated 06/28/2021 63.4  0 - 100 MG/DL Final    VLDL, calculated 06/28/2021 12.6  MG/DL Final    CHOL/HDL Ratio 06/28/2021 2.5  0.0 - 5.0   Final    Hemoglobin A1c 06/28/2021 5.1  4.0 - 5.6 % Final    Est. average glucose 06/28/2021 100  mg/dL Final   Admission on 06/24/2021, Discharged on 06/24/2021   Component Date Value Ref Range Status    Valproic acid 06/24/2021 <3* 50 - 100 ug/ml Final    Sodium 06/24/2021 139  136 - 145 mmol/L Final    Potassium 06/24/2021 3.5  3.5 - 5.1 mmol/L Final    Chloride 06/24/2021 107  97 - 108 mmol/L Final    CO2 06/24/2021 24  21 - 32 mmol/L Final    Anion gap 06/24/2021 8  5 - 15 mmol/L Final    Glucose 06/24/2021 91  65 - 100 mg/dL Final    BUN 06/24/2021 10  6 - 20 MG/DL Final    Creatinine 06/24/2021 0.89  0.70 - 1.30 MG/DL Final    BUN/Creatinine ratio 06/24/2021 11* 12 - 20   Final    GFR est AA 06/24/2021 >60  >60 ml/min/1.73m2 Final    GFR est non-AA 06/24/2021 >60  >60 ml/min/1.73m2 Final    Calcium 06/24/2021 8.9  8.5 - 10.1 MG/DL Final    Bilirubin, total 06/24/2021 1.0  0.2 - 1.0 MG/DL Final    ALT (SGPT) 06/24/2021 36  12 - 78 U/L Final    AST (SGOT) 06/24/2021 28  15 - 37 U/L Final    Alk.  phosphatase 06/24/2021 63  45 - 117 U/L Final    Protein, total 06/24/2021 7.1  6.4 - 8.2 g/dL Final    Albumin 06/24/2021 4.3  3.5 - 5.0 g/dL Final    Globulin 06/24/2021 2.8  2.0 - 4.0 g/dL Final    A-G Ratio 06/24/2021 1.5  1.1 - 2.2   Final    WBC 06/24/2021 7.6  4.1 - 11.1 K/uL Final    RBC 06/24/2021 4.74  4.10 - 5.70 M/uL Final    HGB 06/24/2021 14.0  12.1 - 17.0 g/dL Final    HCT 06/24/2021 41.7  36.6 - 50.3 % Final    MCV 06/24/2021 88.0  80.0 - 99.0 FL Final    MCH 06/24/2021 29.5  26.0 - 34.0 PG Final    MCHC 06/24/2021 33.6  30.0 - 36.5 g/dL Final    RDW 06/24/2021 12.3  11.5 - 14.5 % Final    PLATELET 26/26/5434 699  150 - 400 K/uL Final    MPV 06/24/2021 10.1  8.9 - 12.9 FL Final    NRBC 06/24/2021 0.0  0  WBC Final    ABSOLUTE NRBC 06/24/2021 0.00  0.00 - 0.01 K/uL Final    NEUTROPHILS 06/24/2021 84* 32 - 75 % Final    LYMPHOCYTES 06/24/2021 10* 12 - 49 % Final    MONOCYTES 06/24/2021 6  5 - 13 % Final    EOSINOPHILS 06/24/2021 0  0 - 7 % Final    BASOPHILS 06/24/2021 0  0 - 1 % Final    IMMATURE GRANULOCYTES 06/24/2021 0  0.0 - 0.5 % Final    ABS. NEUTROPHILS 06/24/2021 6.3  1.8 - 8.0 K/UL Final    ABS. LYMPHOCYTES 06/24/2021 0.8  0.8 - 3.5 K/UL Final    ABS. MONOCYTES 06/24/2021 0.5  0.0 - 1.0 K/UL Final    ABS. EOSINOPHILS 06/24/2021 0.0  0.0 - 0.4 K/UL Final    ABS. BASOPHILS 06/24/2021 0.0  0.0 - 0.1 K/UL Final    ABS. IMM. GRANS. 06/24/2021 0.0  0.00 - 0.04 K/UL Final    DF 06/24/2021 SMEAR SCANNED    Final    RBC COMMENTS 06/24/2021 NORMOCYTIC, NORMOCHROMIC    Final    ALCOHOL(ETHYL),SERUM 06/24/2021 <10  <10 MG/DL Final    AMPHETAMINES 06/24/2021 Negative  NEG   Final    BARBITURATES 06/24/2021 Negative  NEG   Final    BENZODIAZEPINES 06/24/2021 Negative  NEG   Final    COCAINE 06/24/2021 Negative  NEG   Final    METHADONE 06/24/2021 Negative  NEG   Final    OPIATES 06/24/2021 Negative  NEG   Final    PCP(PHENCYCLIDINE) 06/24/2021 Negative  NEG   Final    THC (TH-CANNABINOL) 06/24/2021 Positive* NEG   Final    Drug screen comment 06/24/2021 (NOTE)   Final    SARS-CoV-2 06/24/2021 Please find results under separate order    Final    Specimen source 06/24/2021 Nasopharyngeal    Final    COVID-19 rapid test 06/24/2021 Not detected  NOTD   Final    SAMPLES BEING HELD 06/24/2021 UR   Final    COMMENT 06/24/2021 Add-on orders for these samples will be processed based on acceptable specimen integrity and analyte stability, which may vary by analyte.     Final    Color 06/24/2021 YELLOW/STRAW    Final    Appearance 06/24/2021 CLEAR  CLEAR   Final    Specific gravity 06/24/2021 1.015  1.003 - 1.030   Final    pH (UA) 06/24/2021 6.5  5.0 - 8.0   Final    Protein 06/24/2021 Negative  NEG mg/dL Final    Glucose 06/24/2021 Negative  NEG mg/dL Final    Ketone 06/24/2021 40* NEG mg/dL Final    Bilirubin 06/24/2021 Negative  NEG   Final    Blood 06/24/2021 Negative  NEG   Final    Urobilinogen 06/24/2021 1.0  0.2 - 1.0 EU/dL Final    Nitrites 06/24/2021 Negative  NEG   Final    Leukocyte Esterase 06/24/2021 Negative  NEG   Final    WBC 06/24/2021 0-4  0 - 4 /hpf Final    RBC 06/24/2021 0-5  0 - 5 /hpf Final    Epithelial cells 06/24/2021 FEW  FEW /lpf Final    Bacteria 06/24/2021 Negative  NEG /hpf Final    UA:UC IF INDICATED 06/24/2021 CULTURE NOT INDICATED BY UA RESULT  CNI   Final    Hyaline cast 06/24/2021 0-2  0 - 5 /lpf Final    SARS-CoV-2 06/24/2021 Not detected  NOTD   Final    Influenza A by PCR 06/24/2021 Not detected  NOTD   Final    Influenza B by PCR 06/24/2021 Not detected  NOTD   Final     No results found. DISPOSITION:    Home. Patient to f/u with drug/etoh rehabilitation, psychiatric, and psychotherapy appointments. Patient is to f/u with internist as directed. FOLLOW-UP CARE:    Activity as tolerated  Regular diet  Wound Care: none needed. Follow-up Information     Follow up With Specialties Details Why [address] [facility]  Go on 7/6/2021 Please go to [facility] in intake appointments between 9:00AM and 2:00PM (Mon-Fri) to begin process of receiving mental health medication, case management and therapy services. [address]  Call Please call to inquire about grief supports  [facility], [facility]  [phone]    Monthly injection   On 7/29/2021 A monthly injection called Gala Copa was started during your hospitalization. Abilify Maintena 400 mg injection was given on 7/1/21. Next injection is due ~7/29/21. Please continue taking oral Abilify until 7/15/21.      Desiree Moser MD Family Medicine   [address] [place] [address]  [address] [address] [phone] PROGNOSIS:   Fair ---- based on nature of patient's pathology/ies and treatment compliance issues. Prognosis is greatly dependent upon patient's ability to remain sober and to follow up with scheduled appointments as well as to comply with psychiatric medications as prescribed. DISCHARGE MEDICATIONS:     Informed consent given for the use of following psychotropic medications:  Current Discharge Medication List      START taking these medications    Details   ARIPiprazole (ABILIFY MAINTENA) 400 mg injection 400 mg by IntraMUSCular route every thirty (30) days. Indications: bipolar disorder in remission  Qty: 400 mg, Refills: 0  Start date: 7/31/2021      ARIPiprazole (ABILIFY) 20 mg tablet Take 1 Tablet by mouth daily. Indications: Bipolar disorder  Qty: 15 Tablet, Refills: 0  Start date: 7/3/2021      levothyroxine (SYNTHROID) 50 mcg tablet Take 1 Tablet by mouth every morning. Indications: a condition with low thyroid hormone levels  Qty: 30 Tablet, Refills: 0  Start date: 7/3/2021      traZODone (DESYREL) 50 mg tablet Take 1 Tablet by mouth nightly as needed for Sleep (For insomnia). Indications: insomnia associated with depression  Qty: 30 Tablet, Refills: 0  Start date: 7/2/2021                    A coordinated, multidisplinary treatment team round was conducted with [place] is done daily here at [facility]. This team consists of the nurse, psychiatric unit pharmacist,  and writer. I have spent greater than 35 minutes on discharge work.     Signed:  Herb Salas MD  7/2/2021

## 2021-07-03 RX ORDER — ARIPIPRAZOLE 20 MG/1
20 TABLET ORAL DAILY
Qty: 15 TABLET | Refills: 0 | Status: SHIPPED | OUTPATIENT
Start: 2021-07-03 | End: 2021-07-03

## 2021-07-03 RX ORDER — LEVOTHYROXINE SODIUM 50 UG/1
50 TABLET ORAL
Qty: 30 TABLET | Refills: 1 | Status: SHIPPED | OUTPATIENT
Start: 2021-07-03

## 2021-07-03 RX ORDER — ARIPIPRAZOLE 20 MG/1
20 TABLET ORAL DAILY
Qty: 15 TABLET | Refills: 0 | Status: SHIPPED | OUTPATIENT
Start: 2021-07-03

## 2021-07-03 RX ORDER — TRAZODONE HYDROCHLORIDE 50 MG/1
50 TABLET ORAL
Qty: 30 TABLET | Refills: 1 | Status: SHIPPED | OUTPATIENT
Start: 2021-07-03

## 2021-07-03 RX ORDER — ARIPIPRAZOLE 400 MG
400 KIT INTRAMUSCULAR
Qty: 400 MG | Refills: 0 | Status: SHIPPED | OUTPATIENT
Start: 2021-07-31

## 2022-03-18 PROBLEM — F31.9 BIPOLAR 1 DISORDER (HCC): Status: ACTIVE | Noted: 2021-06-25

## 2023-05-16 RX ORDER — TRAZODONE HYDROCHLORIDE 50 MG/1
TABLET ORAL
COMMUNITY
Start: 2021-07-03

## 2023-05-16 RX ORDER — ARIPIPRAZOLE 20 MG/1
TABLET ORAL DAILY
COMMUNITY
Start: 2021-07-03

## 2023-05-16 RX ORDER — LEVOTHYROXINE SODIUM 0.05 MG/1
TABLET ORAL
COMMUNITY
Start: 2021-07-03